# Patient Record
Sex: FEMALE | Race: WHITE | NOT HISPANIC OR LATINO | Employment: OTHER | URBAN - METROPOLITAN AREA
[De-identification: names, ages, dates, MRNs, and addresses within clinical notes are randomized per-mention and may not be internally consistent; named-entity substitution may affect disease eponyms.]

---

## 2017-12-04 ENCOUNTER — OFFICE VISIT (OUTPATIENT)
Dept: NEUROLOGY | Facility: CLINIC | Age: 67
End: 2017-12-04
Payer: MEDICARE

## 2017-12-04 VITALS
HEIGHT: 62 IN | WEIGHT: 153.69 LBS | BODY MASS INDEX: 28.28 KG/M2 | SYSTOLIC BLOOD PRESSURE: 146 MMHG | DIASTOLIC BLOOD PRESSURE: 88 MMHG | HEART RATE: 84 BPM | RESPIRATION RATE: 17 BRPM

## 2017-12-04 DIAGNOSIS — G95.9 MYELOPATHY: ICD-10-CM

## 2017-12-04 DIAGNOSIS — R47.9 SPEECH DISORDER: ICD-10-CM

## 2017-12-04 DIAGNOSIS — R27.0 ATAXIA: Primary | ICD-10-CM

## 2017-12-04 PROCEDURE — 99999 PR PBB SHADOW E&M-NEW PATIENT-LVL III: CPT | Mod: PBBFAC,,, | Performed by: PSYCHIATRY & NEUROLOGY

## 2017-12-04 PROCEDURE — 99203 OFFICE O/P NEW LOW 30 MIN: CPT | Mod: PBBFAC | Performed by: PSYCHIATRY & NEUROLOGY

## 2017-12-04 PROCEDURE — 99204 OFFICE O/P NEW MOD 45 MIN: CPT | Mod: S$PBB | Performed by: PSYCHIATRY & NEUROLOGY

## 2017-12-04 PROCEDURE — 99999 PR STA SHADOW: CPT | Mod: PBBFAC,,, | Performed by: PSYCHIATRY & NEUROLOGY

## 2017-12-04 RX ORDER — EPINEPHRINE 0.22MG
100 AEROSOL WITH ADAPTER (ML) INHALATION DAILY
COMMUNITY

## 2017-12-04 RX ORDER — HYDROCHLOROTHIAZIDE 12.5 MG/1
12.5 CAPSULE ORAL DAILY
COMMUNITY
Start: 2017-11-29 | End: 2022-07-14

## 2017-12-04 RX ORDER — ASPIRIN 81 MG/1
81 TABLET ORAL DAILY
COMMUNITY

## 2017-12-04 RX ORDER — LISINOPRIL 40 MG/1
40 TABLET ORAL DAILY
COMMUNITY
Start: 2017-11-29

## 2017-12-04 RX ORDER — DIAZEPAM 5 MG/1
TABLET ORAL
Qty: 2 TABLET | Refills: 0 | Status: SHIPPED | OUTPATIENT
Start: 2017-12-04 | End: 2017-12-27 | Stop reason: CLARIF

## 2017-12-04 RX ORDER — CYANOCOBALAMIN 1000 UG/ML
1000 INJECTION, SOLUTION INTRAMUSCULAR; SUBCUTANEOUS WEEKLY
COMMUNITY
End: 2017-12-27 | Stop reason: CLARIF

## 2017-12-04 NOTE — PROGRESS NOTES
"Consult from Dr Villalta  HPI: Tari Pablo is a 67 y.o. female with history of abnormal gait which started earlier this year. She is here with her daughter today.  Walking difficulty is described as "being off balance" with initially starting gait. A walker has helped. Daughter has noted difficulty with her speech during this time which is not worse and may be better and seemed worse in the afternoon. Speech was slower to family.   She she has no shaking tremor. No double vision. No faintness. Her last fall was in October. She continues to drive without difficulty.   Patient's parents did not have ambulatory problems. She has a family history of CMT in her cousins  Note she saw another neurologist (Dr Galvez) early this year for slurred speech for several months and had MRI brain, EEG, UE EMG, Carotid US and Lumbar xray as below  No numbness in the feet.   She is   She lives along  Review of Systems   Constitutional: Negative for fever.   HENT: Negative for nosebleeds.    Eyes: Negative for double vision.   Respiratory: Negative for hemoptysis.    Cardiovascular: Negative for leg swelling.   Gastrointestinal: Negative for blood in stool.   Genitourinary: Negative for hematuria.   Musculoskeletal: Positive for falls. Negative for neck pain.   Skin: Negative for rash.   Neurological: Negative for sensory change and seizures.   Psychiatric/Behavioral: Negative for memory loss.         I have reviewed all of this patient's past medical and surgical histories as well as family and social histories and active allergies and medications as documented in the electronic medical record.    Exam:  Gen Appearance, well developed/nourished in no apparent distress  CV: 2+ distal pulses with no edema or swelling  Neuro:  MS: Awake, alert, oriented to place, person, time, situation. Sustains attention. Recent/remote memory intact, Language is full to spontaneous speech/repetition/naming/comprehension. Fund of Knowledge is full " "but patient's speech seems mildly slow, slurred, and scanning- cerebellar speech deficit???? This is mild.   CN: Optic discs are flat with normal vasculature, PERRL, Extraoccular movements and visual fields are full. Normal facial sensation and strength, Hearing symmetric, Tongue and Palate are midline and strong. Shoulder Shrug symmetric and strong.  Motor: Normal bulk, tone is increased to spasticity in the legs more than arms, no abnormal movements. 5/5 strength bilateral upper/lower extremities with 2+ reflexes in the arms and 4+ in the legs with right foot clonus and bilateral plantar response  There is reduced blink and reduced facial expression  Sensory: symmetric to light touch,  temp, and vibration,  Romberg negative  Cerebellar: Finger-nose,Heal-shin, Rapid alternating movements intact  Gait: stance is mildly wide based, there is moderate  Ataxia and shuffling    Imaging: MRI brain 2017 "multiple large areas of increased signal on T2 and FLAIR within in the white matter bilaterally likely representing white matter ischemic change"    Lumbar xray 2017: facet arthropathy and vacuum disc    Carotid US: less than 40% stenosis 2017    EE: normal    EMG 2017: no denervation in the head and neck         Assessment/Plan: Tari Pablo is a 67 y.o. female with several months of speech and gait changes. 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   I recommend:   1. Repeat MRI brain given her large white matter changes prior. Add MRI Cervical spine to rule out process to explain her symptoms  Take one tablet 45 minutes prior to MRI. If needed, take an additional tablet when called for MRI.   2. Labs: B12, HTLV antibodies,CK, RPR, Heavy metal screening, Thiamine, Zinc, Copper, VLFA, JULIO C, TSH, CMP, CBC, Paraneoplastic antibodies and anti-ALEXY antibodies.   3. Obtain Labs and Last " note from Dr Galvez. Note her 2017 MRI brain, EEG, UE EMG, Carotid US and Lumbar xray all largely unremarkable other than white matter changes on MRI brain.   4. If the above is unremarkable, she will need an ataxia gene evaluation as discussed.  NOTE: She has a family history of CMT in her cousins. Continue PT. Fall precautions reviewed.   RTC after the above  CC: Dr Villalta

## 2017-12-04 NOTE — LETTER
December 4, 2017      Isai Villalta MD  [address] [place] [id number]           [facility]  [address] [facility] [address]  Phone: [phone]  Fax: [phone]          Patient: Tari Pablo   MR Number: [id number]   YOB: 1950   Date of Visit: 12/4/2017       Dear Dr. Isai Villalta:    Thank you for referring Tari Pablo to me for evaluation. Attached you will find relevant portions of my assessment and plan of care.    If you have questions, please do not hesitate to call me. I look forward to following Tari Pablo along with you.    Sincerely,    Neil Pop MD    Enclosure  CC:  No Recipients    If you would like to receive this communication electronically, please contact [email or web address] or [phone] to request more information on [employer] Link access.    For providers and/or their staff who would like to refer a patient to [facility], please contact us through our one-stop-shop provider referral line, [facility], at [phone].    If you feel you have received this communication in error or would no longer like to receive these types of communications, please e-mail [email or web address]

## 2017-12-05 ENCOUNTER — LAB VISIT (OUTPATIENT)
Dept: LAB | Facility: HOSPITAL | Age: 67
End: 2017-12-05
Attending: PSYCHIATRY & NEUROLOGY
Payer: MEDICARE

## 2017-12-05 DIAGNOSIS — R27.0 ATAXIA: ICD-10-CM

## 2017-12-05 DIAGNOSIS — G95.9 MYELOPATHY: ICD-10-CM

## 2017-12-05 DIAGNOSIS — R47.9 SPEECH DISORDER: ICD-10-CM

## 2017-12-05 LAB
ALBUMIN SERPL BCP-MCNC: 4 G/DL
ALP SERPL-CCNC: 79 U/L
ALT SERPL W/O P-5'-P-CCNC: 19 U/L
ANION GAP SERPL CALC-SCNC: 10 MMOL/L
AST SERPL-CCNC: 18 U/L
BASOPHILS # BLD AUTO: 0.04 K/UL
BASOPHILS NFR BLD: 0.5 %
BILIRUB SERPL-MCNC: 0.6 MG/DL
BUN SERPL-MCNC: 22 MG/DL
CALCIUM SERPL-MCNC: 10 MG/DL
CHLORIDE SERPL-SCNC: 103 MMOL/L
CK SERPL-CCNC: 71 U/L
CO2 SERPL-SCNC: 29 MMOL/L
CREAT SERPL-MCNC: 0.8 MG/DL
DIFFERENTIAL METHOD: NORMAL
EOSINOPHIL # BLD AUTO: 0.1 K/UL
EOSINOPHIL NFR BLD: 1.8 %
ERYTHROCYTE [DISTWIDTH] IN BLOOD BY AUTOMATED COUNT: 13.5 %
EST. GFR  (AFRICAN AMERICAN): >60 ML/MIN/1.73 M^2
EST. GFR  (NON AFRICAN AMERICAN): >60 ML/MIN/1.73 M^2
GLUCOSE SERPL-MCNC: 108 MG/DL
HCT VFR BLD AUTO: 41.9 %
HGB BLD-MCNC: 13.7 G/DL
LYMPHOCYTES # BLD AUTO: 1.8 K/UL
LYMPHOCYTES NFR BLD: 23.3 %
MCH RBC QN AUTO: 29.7 PG
MCHC RBC AUTO-ENTMCNC: 32.7 G/DL
MCV RBC AUTO: 91 FL
MONOCYTES # BLD AUTO: 0.5 K/UL
MONOCYTES NFR BLD: 5.9 %
NEUTROPHILS # BLD AUTO: 5.2 K/UL
NEUTROPHILS NFR BLD: 68.5 %
PLATELET # BLD AUTO: 285 K/UL
PMV BLD AUTO: 10.2 FL
POTASSIUM SERPL-SCNC: 4 MMOL/L
PROT SERPL-MCNC: 7.6 G/DL
RBC # BLD AUTO: 4.61 M/UL
SODIUM SERPL-SCNC: 142 MMOL/L
TSH SERPL DL<=0.005 MIU/L-ACNC: 1.41 UIU/ML
VIT B12 SERPL-MCNC: 632 PG/ML
WBC # BLD AUTO: 7.65 K/UL

## 2017-12-05 PROCEDURE — 85025 COMPLETE CBC W/AUTO DIFF WBC: CPT

## 2017-12-05 PROCEDURE — 86038 ANTINUCLEAR ANTIBODIES: CPT

## 2017-12-05 PROCEDURE — 82607 VITAMIN B-12: CPT

## 2017-12-05 PROCEDURE — 84443 ASSAY THYROID STIM HORMONE: CPT

## 2017-12-05 PROCEDURE — 84425 ASSAY OF VITAMIN B-1: CPT

## 2017-12-05 PROCEDURE — 86592 SYPHILIS TEST NON-TREP QUAL: CPT

## 2017-12-05 PROCEDURE — 36415 COLL VENOUS BLD VENIPUNCTURE: CPT

## 2017-12-05 PROCEDURE — 84630 ASSAY OF ZINC: CPT

## 2017-12-05 PROCEDURE — 86687 HTLV-I ANTIBODY: CPT

## 2017-12-05 PROCEDURE — 80053 COMPREHEN METABOLIC PANEL: CPT

## 2017-12-05 PROCEDURE — 82550 ASSAY OF CK (CPK): CPT

## 2017-12-05 PROCEDURE — 82525 ASSAY OF COPPER: CPT

## 2017-12-06 LAB
ANA SER QL IF: NORMAL
ARSENIC BLD-MCNC: <1 NG/ML (ref 0–12)
CADMIUM BLD-MCNC: 0.4 NG/ML (ref 0–4.9)
CITY: NORMAL
COUNTY: NORMAL
GUARDIAN FIRST NAME: NORMAL
GUARDIAN LAST NAME: NORMAL
HOME PHONE: NORMAL
HTLV I+II AB SER QL IA: NEGATIVE
LEAD BLD-MCNC: <1 MCG/DL (ref 0–4.9)
MERCURY BLD-MCNC: <1 NG/ML (ref 0–9)
RACE: NORMAL
RPR SER QL: NORMAL
STATE: NORMAL
STREET ADDRESS: NORMAL
VENOUS/CAPILLARY: NORMAL
ZIP: NORMAL

## 2017-12-07 LAB
COPPER SERPL-MCNC: 1444 UG/L (ref 810–1990)
ZINC SERPL-MCNC: 86 UG/DL (ref 60–130)

## 2017-12-08 ENCOUNTER — HOSPITAL ENCOUNTER (OUTPATIENT)
Dept: RADIOLOGY | Facility: HOSPITAL | Age: 67
Discharge: HOME | End: 2017-12-08
Attending: PSYCHIATRY & NEUROLOGY
Payer: MEDICARE

## 2017-12-08 DIAGNOSIS — R47.9 SPEECH DISORDER: ICD-10-CM

## 2017-12-08 DIAGNOSIS — G95.9 MYELOPATHY: ICD-10-CM

## 2017-12-08 DIAGNOSIS — R27.0 ATAXIA: ICD-10-CM

## 2017-12-08 LAB
ANNOTATION COMMENT IMP: NORMAL
GAD65 AB SER-SCNC: 0.01 NMOL/L
PHYTANATE SERPL-SCNC: 1.37 NMOL/ML
PRISTANATE SERPL-SCNC: 0.17 NMOL/ML
PRISTANATE/PHYTANATE SERPL-SRTO: 0.12 RATIO
VIT B1 SERPL-MCNC: 57 UG/L (ref 38–122)
VLCFA C22:0 SERPL-SCNC: 85.8 NMOL/ML
VLCFA C24:0 SERPL-SCNC: 66.3 NMOL/ML
VLCFA C24:0/C22:0 SERPL-SRTO: 0.77 RATIO
VLCFA C26:0 SERPL-SCNC: 0.59 NMOL/ML
VLCFA C26:0/C22:0 SERPL-SRTO: 0.01 RATIO

## 2017-12-08 PROCEDURE — 70553 MRI BRAIN STEM W/O & W/DYE: CPT | Mod: TC

## 2017-12-08 PROCEDURE — 72141 MRI NECK SPINE W/O DYE: CPT | Mod: TC

## 2017-12-08 PROCEDURE — 70553 MRI BRAIN STEM W/O & W/DYE: CPT | Mod: 26,,, | Performed by: RADIOLOGY

## 2017-12-08 PROCEDURE — 25500020 PHARM REV CODE 255: Performed by: PSYCHIATRY & NEUROLOGY

## 2017-12-08 PROCEDURE — A9585 GADOBUTROL INJECTION: HCPCS | Performed by: PSYCHIATRY & NEUROLOGY

## 2017-12-08 PROCEDURE — 72141 MRI NECK SPINE W/O DYE: CPT | Mod: 26,,, | Performed by: RADIOLOGY

## 2017-12-08 RX ORDER — GADOBUTROL 604.72 MG/ML
6 INJECTION INTRAVENOUS
Status: COMPLETED | OUTPATIENT
Start: 2017-12-08 | End: 2017-12-08

## 2017-12-08 RX ADMIN — GADOBUTROL 6 ML: 604.72 INJECTION INTRAVENOUS at 11:12

## 2017-12-12 LAB
ACHR BIND AB SER-SCNC: 0.01 NMOL/L
AMPHIPHYSIN AB TITR SER: NEGATIVE TITER
CV2 IGG TITR SER: NEGATIVE TITER
GLIAL NUC TYPE 1 AB TITR SER: NEGATIVE TITER
HU1 AB TITR SER: NEGATIVE TITER
HU2 AB TITR SER IF: NEGATIVE TITER
HU3 AB TITR SER: NEGATIVE TITER
IMMUNOLOGIST REVIEW: NORMAL
NACHR AB SER-SCNC: 0 NMOL/L
PAVAL REFLEX TEST ADDED: NORMAL
PCA-1 AB TITR SER: NEGATIVE TITER
PCA-2 AB TITR SER: NEGATIVE TITER
PCA-TR AB TITR SER: NEGATIVE TITER
STRIA MUS AB TITR SER: NEGATIVE TITER
VGCC-N BIND AB SER-SCNC: 0 NMOL/L
VGCC-P/Q BIND AB SER-SCNC: 0 NMOL/L
VGKC AB SER-SCNC: 0 NMOL/L

## 2017-12-15 DIAGNOSIS — R27.0 ATAXIA: Primary | ICD-10-CM

## 2017-12-21 ENCOUNTER — TELEPHONE (OUTPATIENT)
Dept: NEUROLOGY | Facility: CLINIC | Age: 67
End: 2017-12-21

## 2017-12-21 NOTE — TELEPHONE ENCOUNTER
Notify patient: The lab draw for genetic testing was denied. There is concern her insurance company will not pay for the testing.  I would like to speak to her more in person about getting another opinion from a genetics or neuromuscular doc about this at he follow up visit scheduled. We will discuss options  Please keep that appointment. Thanks.

## 2017-12-27 PROBLEM — S52.572A: Status: ACTIVE | Noted: 2017-12-27

## 2017-12-28 PROBLEM — S52.572A CLOSED DIE PUNCH FRACTURE OF DISTAL RADIUS, LEFT, INITIAL ENCOUNTER: Status: ACTIVE | Noted: 2017-12-28

## 2018-01-05 ENCOUNTER — HOSPITAL ENCOUNTER (OUTPATIENT)
Dept: RADIOLOGY | Facility: HOSPITAL | Age: 68
Discharge: HOME | End: 2018-01-05
Attending: PSYCHIATRY & NEUROLOGY
Payer: MEDICARE

## 2018-01-05 ENCOUNTER — OFFICE VISIT (OUTPATIENT)
Dept: NEUROLOGY | Facility: CLINIC | Age: 68
End: 2018-01-05
Payer: MEDICARE

## 2018-01-05 VITALS
RESPIRATION RATE: 16 BRPM | HEIGHT: 62 IN | BODY MASS INDEX: 27.79 KG/M2 | DIASTOLIC BLOOD PRESSURE: 80 MMHG | SYSTOLIC BLOOD PRESSURE: 128 MMHG | HEART RATE: 89 BPM | WEIGHT: 151 LBS

## 2018-01-05 DIAGNOSIS — R31.21 ASYMPTOMATIC MICROSCOPIC HEMATURIA: ICD-10-CM

## 2018-01-05 DIAGNOSIS — E04.1 THYROID NODULE: ICD-10-CM

## 2018-01-05 DIAGNOSIS — R27.0 ATAXIA: Primary | ICD-10-CM

## 2018-01-05 DIAGNOSIS — R47.9 SPEECH DISORDER: ICD-10-CM

## 2018-01-05 DIAGNOSIS — R25.1 TREMOR: ICD-10-CM

## 2018-01-05 PROCEDURE — 76536 US EXAM OF HEAD AND NECK: CPT | Mod: 26,,, | Performed by: RADIOLOGY

## 2018-01-05 PROCEDURE — 99999 PR STA SHADOW: CPT | Mod: PBBFAC,,, | Performed by: PSYCHIATRY & NEUROLOGY

## 2018-01-05 PROCEDURE — 99214 OFFICE O/P EST MOD 30 MIN: CPT | Mod: S$PBB | Performed by: PSYCHIATRY & NEUROLOGY

## 2018-01-05 PROCEDURE — 76536 US EXAM OF HEAD AND NECK: CPT | Mod: TC

## 2018-01-05 PROCEDURE — 99213 OFFICE O/P EST LOW 20 MIN: CPT | Mod: PBBFAC | Performed by: PSYCHIATRY & NEUROLOGY

## 2018-01-05 PROCEDURE — 99999 PR PBB SHADOW E&M-EST. PATIENT-LVL III: CPT | Mod: PBBFAC,,, | Performed by: PSYCHIATRY & NEUROLOGY

## 2018-01-05 NOTE — PROGRESS NOTES
HPI: hi Pablo is a 67 y.o. female with several months of speech and gait changes. 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   Since the last visit, the patient fell and broke her left wrist. She fell back while attempting to move a fridge door.  She is s/p surgery for this. She completed PT at this time- may have more later per ortho.    Recently she is more annoyed by not having a diagnosis. She is not depressed. Family states she has always cried easily over many years.     Review of Systems   Constitutional: Negative for fever.   HENT: Negative for nosebleeds.    Eyes: Negative for double vision.   Respiratory: Negative for hemoptysis.    Cardiovascular: Negative for leg swelling.   Gastrointestinal: Negative for blood in stool.   Genitourinary: Negative for hematuria.   Musculoskeletal: Positive for falls. Negative for neck pain.   Skin: Negative for rash.   Neurological: Positive for tremors.        She states she notes a mild and tiny episodic tremor bilateral since breaking wrist.    Psychiatric/Behavioral: Negative for depression and memory loss.         I have reviewed all of this patient's past medical and surgical histories as well as family and social histories and active allergies and medications as documented in the electronic medical record.    Exam:  Gen Appearance, well developed/nourished in no apparent distress  CV: 2+ distal pulses with no edema or swelling  Neuro:  MS: Awake, alert,Sustains attention. Recent/remote memory intact, Language is full to spontaneous speech/comprehension. Fund of Knowledge is full but patient's speech seems mildly slow, slurred, and scanning- cerebellar speech deficit which is subtle.   She has mild emotional incontinence possible  CN: Optic discs are flat with normal vasculature, PERRL, Extraoccular movements and visual fields are  full. Normal facial sensation and strength, Hearing symmetric, Tongue and Palate are midline and strong. Shoulder Shrug symmetric and strong.  Motor: Normal bulk, tone is increased to spasticity in the legs more than arms and more spasticity on the right today, no abnormal movement at rest but tremor of outstretched.  5/5 strength bilateral upper/lower extremities with 2+ reflexes in the arms and 4+ in the legs with right foot clonus    There is reduced blink and reduced facial expression  Sensory: Romberg negative  Cerebellar: Finger-nose,Heal-shin, Rapid alternating movements intact  Gait: stance is mildly wide based, there is moderate  Ataxia and shuffling which is worse today. There is mild impairment of postural reflexes.     Imaging: MRI brain repeat 2017 (repeated after 2017 exam: Age-appropriate generalized volume loss with scattered T2/FLAIR signal abnormality within the supratentorial white matter  while nonspecific suggestive for moderate degree of  chronic microvascular ischemic change.    No evidence for acute infarction or enhancing lesion.    MRI C spine: Multilevel degenerative changes of the cervical spine contributing to central canal stenosis or neural foraminal narrowing as detailed in the above report.    Atrophy of the thyroid gland with bilateral thyroid nodules.  Consider further evaluation with thyroid ultrasound as clinically warranted      Carotid US: less than 40% stenosis 2017    EE: normal    EMG 2017: no denervation in the head and neck     Labs: 2017: B12, HTLV antibodies,CK, RPR, Heavy metal screening, Thiamine, Zinc, Copper, VLFA, JULIO C, TSH, CMP, CBC, Paraneoplastic antibodies and anti-ALEXY antibodies    2017 UA done pre-op for wrist surgery: Trace of blood (non-cath specimen)    Assessment/Plan: Tari Pablo is a 67 y.o. female with several months of speech and gait changes. Exam shows scanning cerebellar speech and myelopathic/ataxic signs as well right sided UMN signs  with increased spasticity. Questioning  Neurodegenerative process like PD plus syndrome (CBGD, OPCD) vs spinal cerebellar ataxia. MRI brain and C spine unremarkable. Note development of tremor more recently.   I recommend:   1. Order thyroid US for further routine evaluation of thyroid nodules.   2. Repeat UA given trace blood found after fall. Consider urology referral if any hematuria on labs or noted by patient.   3. Refer to movement disorders specialist for another opinion about  her symptoms which have been of subacute onset.  4. Genetic testing to rule out SCA was denied. But consider referral to genetics specialist if no other cause found/treated.NOTE: She has a family history of CMT in her cousins.   5. Fall precautions reviewed. She is pending more PT with orthopedist.   6. Watch for pseudobulbar affect over time.     RTC after the above.

## 2018-01-10 ENCOUNTER — TELEPHONE (OUTPATIENT)
Dept: NEUROLOGY | Facility: CLINIC | Age: 68
End: 2018-01-10

## 2018-01-10 DIAGNOSIS — R26.9 ABNORMAL GAIT: Primary | ICD-10-CM

## 2018-01-10 NOTE — TELEPHONE ENCOUNTER
Patient calling that she understood that you want her to start PT for her back and legs. She is doing PT right now for her wrist, ordered by her orthopedist. If you would want her to start PT for her back and legs, we will need new orders sent to [facility]. Please advise.

## 2018-02-28 ENCOUNTER — OFFICE VISIT (OUTPATIENT)
Dept: NEUROLOGY | Facility: CLINIC | Age: 68
End: 2018-02-28
Payer: MEDICARE

## 2018-02-28 VITALS
SYSTOLIC BLOOD PRESSURE: 145 MMHG | HEIGHT: 62 IN | WEIGHT: 151.25 LBS | BODY MASS INDEX: 27.83 KG/M2 | DIASTOLIC BLOOD PRESSURE: 84 MMHG | HEART RATE: 84 BPM

## 2018-02-28 DIAGNOSIS — G23.1 PROGRESSIVE SUPRANUCLEAR PALSY: Primary | ICD-10-CM

## 2018-02-28 DIAGNOSIS — R47.9 SPEECH DISORDER: ICD-10-CM

## 2018-02-28 DIAGNOSIS — R26.9 GAIT DISTURBANCE: ICD-10-CM

## 2018-02-28 DIAGNOSIS — R29.6 MULTIPLE FALLS: ICD-10-CM

## 2018-02-28 PROCEDURE — 99214 OFFICE O/P EST MOD 30 MIN: CPT | Mod: PBBFAC | Performed by: STUDENT IN AN ORGANIZED HEALTH CARE EDUCATION/TRAINING PROGRAM

## 2018-02-28 PROCEDURE — 99999 PR PBB SHADOW E&M-EST. PATIENT-LVL IV: CPT | Mod: PBBFAC,GC,, | Performed by: STUDENT IN AN ORGANIZED HEALTH CARE EDUCATION/TRAINING PROGRAM

## 2018-02-28 PROCEDURE — 99204 OFFICE O/P NEW MOD 45 MIN: CPT | Mod: S$PBB,GC,, | Performed by: PSYCHIATRY & NEUROLOGY

## 2018-02-28 RX ORDER — MULTIVITAMIN
1 TABLET ORAL DAILY
COMMUNITY

## 2018-02-28 RX ORDER — CARBIDOPA AND LEVODOPA 25; 100 MG/1; MG/1
1 TABLET ORAL 3 TIMES DAILY
Qty: 90 TABLET | Refills: 11 | Status: SHIPPED | OUTPATIENT
Start: 2018-02-28 | End: 2018-05-21 | Stop reason: SDUPTHER

## 2018-02-28 NOTE — PROGRESS NOTES
Patient Name: Tari Pablo  MRN: [id number]    CC: Gait difficulty    HPI: Tari Pablo is a 67 y.o. female who presents for evaluation of difficulty with speech and walking. She is accompanied by her brother and sister who assist with history. Her symptoms were first noticed around August 2016, when her sister's friend who is a speech pathologist noticed that her speech was slow. Since then she has had progressive difficulty with walking, and now requires a walker for support. She describes it mostly as difficulty getting started and stopping. She has had multiple falls related to tripping, or when she is caught off guard and loses balance (eg. One morning she went out to get the mail and a wasp flew out which startled her and she fell). This has resulted in bilateral wrist fractures at separate times, both requiring surgery. She also describes feeling frozen in space at times. She denies any associated dizziness or lightheadedness. Her speech has also progressively worsened, and she finds it difficult to get words out at times. She has no difficulty with language or understanding others. She does describe coughing with eating a few times, but is not concerned with this.     She denies any focal weakness, numbness or tremors. She has had no vision changes, urinary or bowel issues. She has always had difficulty staying asleep which has remained unchanged, and denies being told that she acts out dreams. She denies any visual or auditory hallucinations, memory or other cognitive issues or changes in smell. She lives alone and is able to perform ADLs independently, but did get some equipment for her bathroom to help. She denies any recent travel - the last time she went out of state was May 2016. She reports a strong family history of CMT in her maternal second cousins. She denies a history of clumsiness and says she was able to keep up with her peers with running and play.    She has seen Dr. Galvez and Dr. Pop  with extensive lab workup, MRI brain and cervical spine, EEG and EMG. All of this has been unremarkable. She has been to physical therapy since June 2017 which she feels has had a mild benefit. She has tried no other therapies.      ROS:   Review of Systems   Constitutional: Negative for malaise/fatigue. Negative for weight loss.   HENT: Negative for hearing loss.   Eyes: Negative for blurred vision and double vision.   Respiratory: Negative for shortness of breath and stridor.   Cardiovascular: Negative for chest pain and palpitations.   Gastrointestinal: Negative for nausea, vomiting and constipation.   Genitourinary: Negative for frequency. Negative for urgency.   Musculoskeletal: Negative for joint pain. Negative for myalgias and falls.   Skin: Negative for rash.   Neurological: Negative for dizziness and tremors. Negative for focal weakness and seizures.   Endo/Heme/Allergies: Does not bruise/bleed easily.   Psychiatric/Behavioral: Negative for memory loss. Negative for depression and hallucinations. The patient is not nervous/anxious.      Past Medical History  Past Medical History:   Diagnosis Date    Arthritis     Balance problem     Fall at home     High cholesterol     Hypertension     Lumbar herniated disc        Medications    Current Outpatient Prescriptions:     aspirin (ECOTRIN) 81 MG EC tablet, Take 81 mg by mouth once daily., Disp: , Rfl:     coenzyme Q10 (CO Q-10) 100 mg capsule, Take 100 mg by mouth once daily., Disp: , Rfl:     hydroCHLOROthiazide (MICROZIDE) 12.5 mg capsule, Take 12.5 mg by mouth once daily. , Disp: , Rfl:     lisinopril (PRINIVIL,ZESTRIL) 40 MG tablet, Take 40 mg by mouth. , Disp: , Rfl:     multivitamin (ONE DAILY MULTIVITAMIN) per tablet, Take 1 tablet by mouth once daily., Disp: , Rfl:     Allergies  Review of patient's allergies indicates:  No Known Allergies    Social History  Social History     Social History    Marital status:      Spouse name: N/A     "Number of children: N/A    Years of education: N/A     Occupational History    Not on file.     Social History Main Topics    Smoking status: Never Smoker    Smokeless tobacco: Never Used    Alcohol use No    Drug use: No    Sexual activity: Not on file     Other Topics Concern    Not on file     Social History Narrative    No narrative on file       Family History  Family History   Problem Relation Age of Onset    Diabetes Mother     Hypertension Mother     Cancer Mother     Stroke Father     Charcot-Emma-Tooth disease Cousin      Multiple maternal cousins       Physical Exam  BP (!) 145/84   Pulse 84   Ht 5' 2" (1.575 m)   Wt 68.6 kg (151 lb 3.8 oz)   BMI 27.66 kg/m²        Constitutional  Well-developed, well-nourished, appears stated age   Neurological    * Mental status      - Orientation  Oriented to person, place, time, and situation     - Memory   Intact recent and remote. 3/3 recall at 5 min     - Attention/concentration  Attentive, vigilant during exam     - Language  Naming & repetition intact, +2-step commands     - Fund of knowledge  Aware of current events     - Executive  Well-organized thoughts     - Other     * Cranial nerves       - CN II  PERRL, visual fields full to confrontation     - CN III, IV, VI  Extraocular movements full. Saccadic intrusions with rightward gaze. Upon down gaze, eyes made small adjustment with brief left deviation and subsequent correction to midline.     - CN V  Sensation V1 - V3 intact     - CN VII  Face strong and symmetric bilaterally     - CN VIII  Hearing reduced on the left     - CN IX, X  Palate raises midline and symmetric. No palatal tremor     - CN XI  SCM and trapezius 5/5 bilaterally     - CN XII  Tongue midline   * Speech  Scanning, stacatto speech. No dysarthria   * Motor  Tone increased throughout, somewhat clasp knife though limited by patient compliance. Muscle bulk normal, strength 4+/5 throughout BUE. Strength 4+/5 in bilateral hip " flexors, otherwise 5/5 in BLE   * Sensory   Intact to temperature and light touch throughout. Vibration mildly reduced in bilateral lower extremities at toes.   * Coordination  No dysmetria with finger-to-nose. Bradykinetic with rapid movements, equal bilaterally.   * Gait  See below.   * Deep tendon reflexes  3+ and symmetric throughout. +Be's bilaterally, upgoing babinski on the right, downgoing on left       Lab and Test Results    WBC   Date Value Ref Range Status   12/27/2017 8.50 3.90 - 12.70 K/uL Final   12/05/2017 7.65 3.90 - 12.70 K/uL Final     Hemoglobin   Date Value Ref Range Status   12/27/2017 14.2 12.0 - 16.0 g/dL Final   12/05/2017 13.7 12.0 - 16.0 g/dL Final     Hematocrit   Date Value Ref Range Status   12/27/2017 41.9 37.0 - 48.5 % Final   12/05/2017 41.9 37.0 - 48.5 % Final     Platelets   Date Value Ref Range Status   12/27/2017 284 150 - 350 K/uL Final   12/05/2017 285 150 - 350 K/uL Final     Glucose   Date Value Ref Range Status   12/27/2017 98 74 - 106 mg/dL Final   12/05/2017 108 70 - 110 mg/dL Final     Sodium   Date Value Ref Range Status   12/27/2017 144 136 - 145 mmol/L Final   12/05/2017 142 136 - 145 mmol/L Final     Potassium   Date Value Ref Range Status   12/27/2017 4.0 3.5 - 5.1 mmol/L Final   12/05/2017 4.0 3.5 - 5.1 mmol/L Final     Chloride   Date Value Ref Range Status   12/27/2017 101 95 - 110 mmol/L Final   12/05/2017 103 95 - 110 mmol/L Final     CO2   Date Value Ref Range Status   12/27/2017 30 (H) 23 - 29 mmol/L Final   12/05/2017 29 23 - 29 mmol/L Final     BUN, Bld   Date Value Ref Range Status   12/27/2017 16 7 - 17 mg/dL Final   12/05/2017 22 8 - 23 mg/dL Final     Creatinine   Date Value Ref Range Status   12/27/2017 0.80 0.70 - 1.20 mg/dL Final   12/05/2017 0.8 0.5 - 1.4 mg/dL Final     Calcium   Date Value Ref Range Status   12/27/2017 10.3 (H) 8.4 - 10.2 mg/dL Final   12/05/2017 10.0 8.7 - 10.5 mg/dL Final     Alkaline Phosphatase   Date Value Ref Range  Status   12/27/2017 96 38 - 145 U/L Final   12/05/2017 79 55 - 135 U/L Final     ALT   Date Value Ref Range Status   12/27/2017 35 10 - 44 U/L Final   12/05/2017 19 10 - 44 U/L Final     AST   Date Value Ref Range Status   12/27/2017 36 14 - 36 U/L Final   12/05/2017 18 10 - 40 U/L Final       Results for EVI EM (MRN [id number]) as of 2/28/2018 08:03   Ref. Range 12/5/2017 08:40 12/27/2017 11:46   Arsenic Latest Ref Range: 0 - 12 ng/mL <1    Thiamine Latest Ref Range: 38 - 122 ug/L 57    Vitamin B-12 Latest Ref Range: 210 - 950 pg/mL 632    Glutamic Acid Decarb Ab Latest Ref Range: <=0.02 nmol/L 0.01    TSH Latest Ref Range: 0.400 - 4.000 uIU/mL 1.411        Results for EVI EM (MRN [id number]) as of 2/28/2018 08:03   Ref. Range 12/5/2017 08:40   AChR Binding Ab, Serum Latest Ref Range: <=0.02 nmol/L 0.01   AChR Ganglionic Neuronal Ab Latest Ref Range: <=0.02 nmol/L 0.00   NMO Interpretive Comments Unknown SEE BELOW   PAVAL reflex test added Unknown None.   Striated Muscle Ab Latest Ref Range: <1:120 titer Negative     Results for [facility]I EM (MRN [id number]) as of 2/28/2018 08:03   Ref. Range 12/5/2017 08:40   HTLV I/II Ab Unknown Negative   RPR Latest Ref Range: Non-reactive  Non-reactive     Results for EVI EM (MRN [id number]) as of 2/28/2018 08:03   Ref. Range 12/5/2017 08:40   C22:0 Latest Ref Range: <=96.3 nmol/mL 85.8   C24:0 Latest Ref Range: <=91.4 nmol/mL 66.3   C24:0/C22:0 Latest Ref Range: <=1.39 ratio 0.77   C26:0 Latest Ref Range: <=1.30 nmol/mL 0.59   C26:0/C22:0 Latest Ref Range: <=0.023 ratio 0.007   Long Chain Fatty Acids Unknown SEE BELOW   Phytanic Acid Latest Ref Range: <=9.88 nmol/mL 1.37   Pristanic Acid Latest Ref Range: <=2.98 nmol/mL 0.17   Pristanic/Phytanic Latest Ref Range: <=0.39 ratio 0.12     Results for EVI EM (MRN [id number]) as of 2/28/2018 08:03   Ref. Range 12/5/2017 08:40   Lead Latest Ref Range: 0.0 - 4.9 mcg/dL <1.0   JULIO C Screen Latest Ref Range: Negative <1:160   "Negative <1:160   Cadmium Latest Ref Range: 0.0 - 4.9 ng/mL 0.4   Copper Latest Ref Range: 810 - 1990 ug/L 1444   CRMP-5 IgG Latest Ref Range: <1:240 titer Negative   Mercury Latest Ref Range: 0 - 9 ng/mL <1   N-Type Calcium Channel Ab Latest Ref Range: <=0.03 nmol/L 0.00   P/Q Type Calcium Channel Ab Latest Ref Range: <=0.02 nmol/L 0.00   PAVAL BRINDA-1, Serum Latest Ref Range: <1:240 titer Negative   PAVAL BRINDA-2, Serum Latest Ref Range: <1:240 titer Negative   PAVAL BRINDA-3, Serum Latest Ref Range: <1:240 titer Negative   PAVAL AGNA-1, Serum Latest Ref Range: <1:240 titer Negative   PAVAL,  Amphiphysin Ab, Serum Latest Ref Range: <1:240 titer Negative   Neuronal (V-G) K+ Channel Ab, Serum Latest Ref Range: <=0.02 nmol/L 0.00   PAVAL, PCA-1, Serum Latest Ref Range: <1:240 titer Negative   PAVAL, PCA-2, Serum Latest Ref Range: <1:240 titer Negative   PAVAL, PCA-Tr, Serum Latest Ref Range: <1:240 titer Negative   Zinc, Serum-ALT Latest Ref Range: 60 - 130 ug/dL 86       Images: MRI Brain 12/2017  Midbrain atrophy. No acute or chronic infarcts.    Independently reviewed YES    Other Tests    EMG 2017  Reportedly normal - "no denervation in the head and neck"    Assessment and Plan    Tari Pablo is a 67 y.o. female who presents with progressive gait and speech abnormalities. On exam she is noted to have findings suggestive of Parkinsonism, including rigidity, bradykinesia and postural instability. Her gait was severely impaired, mostly with initiation and stopping. Though subtle, she also had some EOM findings with saccadic intrusions and compensatory movements for mildly weakened downgaze. Putting this together, she likely has Progressive Supranuclear Palsy which is supported by the mild midbrain atrophy seen on MRI as above. After discussing the options with the patient, we decided to proceed with a levodopa trial with gradual titration over 4 weeks to reach 25-100mg TID. She was educated on the side effects, and will " call/message with any issues. I have also ordered speech therapy in addition to her physical therapy she is already receiving.     I will follow up with her in about 6 weeks to see if she is having any benefit from the levodopa. All questions and concerns were addressed.      Problem List Items Addressed This Visit        Neuro    Progressive supranuclear palsy - Primary    Overview     August 2016 - speech slowing  Progressive gait disturbance         Current Assessment & Plan     -- Start sinemet trial - titrate up over 4 weeks to  TID  -- Patient educated on side effects  -- Continue PT  -- Referral to SLP  -- Follow up 6 weeks         Relevant Orders    Ambulatory Referral to Speech Therapy    Speech disorder    Current Assessment & Plan     -- due to PSP  -- Speech therapy referral            Other    Gait disturbance    Overview     Progressive worsening since end of 2016         Current Assessment & Plan     -- see PSP         Multiple falls    Overview     Bilateral wrist fractures (different times) requiring surgery.  Uses walker         Current Assessment & Plan     -- see PSP                     Vianey Guan MD  Neurology Resident   [facility]  [address] [place] [address]  Pager: [phone]

## 2018-02-28 NOTE — PATIENT INSTRUCTIONS
Carbidopa/levodopa 25/100 titration    Week 1: Take 1/2 tab in AM and 1/2 tab at noon and 1/2 tab in PM  Week 2: Take 1 tab in AM and 1/2 tab at noon and 1/2 tab in PM  Week 3: Take 1 tab in AM and 1 tab at noon and 1/2 tab in PM  Week 4: Take 1 tab in AM and 1 tab at noon and 1 tab in PM    If adequately improved, can stop and maintain at any level of the titration.  Call me with an update anytime.          Carbidopa; Levodopa tablets  What is this medicine?  CARBIDOPA;LEVODOPA (dontae morales; rivka morales) is used to treat the symptoms of Parkinson's disease.  How should I use this medicine?  Take this medicine by mouth with a glass of water. Follow the directions on the prescription label. Take your doses at regular intervals. Do not take your medicine more often than directed. Do not stop taking except on the advice of your doctor or health care professional.  Talk to your pediatrician regarding the use of this medicine in children. Special care may be needed.  What side effects may I notice from receiving this medicine?  Side effects that you should report to your doctor or health care professional as soon as possible:  · allergic reactions like skin rash, itching or hives, swelling of the face, lips, or tongue  · anxiety, confusion, or nervousness  · falling asleep during normal activities like driving  · fast, irregular heartbeat  · hallucination, loss of contact with reality  · mood changes like aggressive behavior, depression  · stomach pain  · trouble passing urine  · uncontrolled movements of the mouth, head, hands, feet, shoulders, eyelids or other unusual muscle movements  Side effects that usually do not require medical attention (report to your doctor or health care professional if they continue or are bothersome):  · headache  · loss of appetite  · muscle twitches  · nausea, vomiting  · nightmares, trouble sleeping  · unusually weak or tired  What may interact with this medicine?  Do not take this  medicine with any of the following medications:  · MAOIs like Marplan, Nardil, and Parnate  · reserpine  · tetrabenazine  This medicine may also interact with the following medications:  · alcohol  · droperidol  · entacapone  · iron supplements or multivitamins with iron  · isoniazid, INH  · linezolid  · medicines for depression, anxiety, or psychotic disturbances  · medicines for high blood pressure  · medicines for sleep  · metoclopramide  · papaverine  · procarbazine  · tedizolid  · rasagiline  · selegiline  · tolcapone  What if I miss a dose?  If you miss a dose, take it as soon as you can. If it is almost time for your next dose, take only that dose. Do not take double or extra doses.  Where should I keep my medicine?  Keep out of the reach of children.  Store at room temperature between 15 and 30 degrees C (59 and 86 degrees F). Protect from light. Throw away any unused medicine after the expiration date.  What should I tell my health care provider before I take this medicine?  They need to know if you have any of these conditions:  · asthma or lung disease  · depression or other mental illness  · diabetes  · glaucoma  · heart disease, including history of a heart attack  · irregular heart beat  · kidney or liver disease  · melanoma or suspicious skin lesions  · stomach or intestine ulcers  · an unusual or allergic reaction to levodopa, carbidopa, other medicines, foods, dyes, or preservatives  · pregnant or trying to get pregnant  · breast-feeding  What should I watch for while using this medicine?  Visit your doctor or health care professional for regular checks on your progress. It may be several weeks or months before you feel the full benefits of this medicine. Continue to take your medicine on a regular schedule. Do not take any additional medicines for Parkinson's disease without first consulting with your health care provider.  You may experience a wearing of effect prior to the time for your next dose  of this medicine. You may also experience an on-off effect where the medicine apparently stops working for anything from a minute to several hours, then suddenly starts working again. Tell your doctor or health care professional if any of these symptoms happen to you. Your dose may need to be changed.  A high protein diet can slow or prevent absorption of this medicine. Avoid high protein foods near the time of taking this medicine to help to prevent these problems. Take this medicine at least 30 minutes before eating or one hour after meals. You may want to eat higher protein foods later in the day or in small amounts. Discuss your diet with your doctor or health care professional or nutritionist.  You may get drowsy or dizzy. Do not drive, use machinery, or do anything that needs mental alertness until you know how this drug affects you. Do not stand or sit up quickly, especially if you are an older patient. This reduces the risk of dizzy or fainting spells. Alcohol can make you more drowsy and dizzy. Avoid alcoholic drinks.  If you find that you have sudden feelings of wanting to sleep during normal activities, like cooking, watching television, or while driving or riding in a car, you should contact your health care professional.  If you are diabetic, this medicine may interfere with the accuracy of some tests for sugar or ketones in the urine (does not interfere with blood tests). Check with your doctor or health care professional before changing the dose of your diabetic medicine.  This medicine may discolor the urine or sweat, making it look darker or red in color. This is of no cause for concern. However, this may stain clothing or fabrics.  There have been reports of increased sexual urges or other strong urges such as gambling while taking some medicines for Parkinson's disease. If you experience any of these urges while taking this medicine, you should report it to your health care provider as soon as  possible.  You should check your skin often for changes to moles and new growths while taking this medicine. Call your doctor if you notice any of these changes.  NOTE:This sheet is a summary. It may not cover all possible information. If you have questions about this medicine, talk to your doctor, pharmacist, or health care provider. Copyright© 2017 [employer]

## 2018-03-04 PROBLEM — R29.6 MULTIPLE FALLS: Status: ACTIVE | Noted: 2018-03-04

## 2018-03-04 PROBLEM — R26.9 GAIT DISTURBANCE: Status: ACTIVE | Noted: 2018-03-04

## 2018-03-04 PROBLEM — G23.1 PROGRESSIVE SUPRANUCLEAR PALSY: Status: ACTIVE | Noted: 2018-03-04

## 2018-03-04 PROBLEM — R47.9 SPEECH DISORDER: Status: ACTIVE | Noted: 2018-03-04

## 2018-03-04 NOTE — ASSESSMENT & PLAN NOTE
-- Start sinemet trial - titrate up over 4 weeks to  TID  -- Patient educated on side effects  -- Continue PT  -- Referral to SLP  -- Follow up 6 weeks

## 2018-03-07 NOTE — PROGRESS NOTES
Patient seen and examined.  I agree with the history, exam, assessment and plan within the resident's note.          Smooth Newsome MD, MPH  Division of Movement and Memory Disorders  [facility]

## 2018-04-02 ENCOUNTER — TELEPHONE (OUTPATIENT)
Dept: NEUROLOGY | Facility: CLINIC | Age: 68
End: 2018-04-02

## 2018-04-02 NOTE — TELEPHONE ENCOUNTER
----- Message from Rachel Jc sent at 2018  9:59 AM CDT -----  Contact: PATIENT  Tari Pablo  MRN: [id number]  : 1950  PCP: Isai Villalta  Home Phone      [phone]  Work Phone      Not on file.  Mobile          [phone]      MESSAGE: Patient states that she was referred to a motion specialist at [facility], she has seen her once & has a follow up scheduled for next week.  She states that the person she saw was not a physician but a resident.  What she is wanting to know is if after that appointment next can she come back to see Dr. Pop for all of her care because it would be much easier for her to get transportation since she no longer drives.        Phone: [phone]

## 2018-04-02 NOTE — TELEPHONE ENCOUNTER
Patient would like to know if she can continue all of her care here instead of traveling [place] and [place] [facility] no longer drives but she will continue going if you thinks she should. She mentions that the provider she saw was not a movement disorders specialist but she did start her on Sinemet and she is to follow up next week. She would like to just come here after the visit next week for all of her care if you are comfortable with that. Please advise.

## 2018-04-02 NOTE — TELEPHONE ENCOUNTER
She should keep appt at [facility] as scheduled this month- she is actually under the care of Dr Newsome, movement disorders specialist.

## 2018-04-10 ENCOUNTER — OFFICE VISIT (OUTPATIENT)
Dept: NEUROLOGY | Facility: CLINIC | Age: 68
End: 2018-04-10
Payer: MEDICARE

## 2018-04-10 VITALS
HEART RATE: 82 BPM | HEIGHT: 62 IN | DIASTOLIC BLOOD PRESSURE: 90 MMHG | WEIGHT: 153 LBS | SYSTOLIC BLOOD PRESSURE: 132 MMHG | BODY MASS INDEX: 28.16 KG/M2

## 2018-04-10 DIAGNOSIS — G23.1 PROGRESSIVE SUPRANUCLEAR PALSY: Primary | ICD-10-CM

## 2018-04-10 DIAGNOSIS — R47.9 SPEECH DISORDER: ICD-10-CM

## 2018-04-10 DIAGNOSIS — R26.9 GAIT DISTURBANCE: ICD-10-CM

## 2018-04-10 DIAGNOSIS — R25.8 BRADYKINESIA: ICD-10-CM

## 2018-04-10 DIAGNOSIS — R29.6 MULTIPLE FALLS: ICD-10-CM

## 2018-04-10 PROCEDURE — 99213 OFFICE O/P EST LOW 20 MIN: CPT | Mod: PBBFAC | Performed by: STUDENT IN AN ORGANIZED HEALTH CARE EDUCATION/TRAINING PROGRAM

## 2018-04-10 PROCEDURE — 99999 PR PBB SHADOW E&M-EST. PATIENT-LVL III: CPT | Mod: PBBFAC,GC,, | Performed by: STUDENT IN AN ORGANIZED HEALTH CARE EDUCATION/TRAINING PROGRAM

## 2018-04-10 PROCEDURE — 99213 OFFICE O/P EST LOW 20 MIN: CPT | Mod: S$PBB,GC,, | Performed by: STUDENT IN AN ORGANIZED HEALTH CARE EDUCATION/TRAINING PROGRAM

## 2018-04-10 NOTE — PATIENT INSTRUCTIONS
THIS IS A TEST  -- Increase sinemet (carbidopa-levodopa) to 1.5 tablets three times per day  -- Email me through [employer] after 4 days (on Saturday) to tell me if it's working  -- If you develop any side effects (nausea, vomiting, abdominal pain, dizziness) let me know immediately

## 2018-04-10 NOTE — PROGRESS NOTES
Patient Name: Tari Pablo  MRN: [id number]    CC: Gait difficulty    Interval history: Tari Pablo is a 67 y.o. female who returns for follow up. Since our last visit she has started sinemet 25-100mg and is up to three times per day (8am, 2pm, 8pm). She says that she definitely has improved, but feels like she still has room to improve. During the first week she felt nauseous with the morning dose, but this has resolved and she is tolerating it well. She hasn't noticed any on/off phenomenon, but says she's the most fatigued around lunch time. She's unsure if her walking is any worse at that time. She denies any new symptoms. She continues to do physical therapy and is hopeful that her walking will continue to improve. She saw speech therapy and was told she didn't require regular follow up with them. She says she no longer gets her mail because her garage and drive way have some uneven ground which makes her feel unsteady.       HPI: Tari Pablo is a 67 y.o. female who presents for evaluation of difficulty with speech and walking. She is accompanied by her brother and sister who assist with history. Her symptoms were first noticed around August 2016, when her sister's friend who is a speech pathologist noticed that her speech was slow. Since then she has had progressive difficulty with walking, and now requires a walker for support. She describes it mostly as difficulty getting started and stopping. She has had multiple falls related to tripping, or when she is caught off guard and loses balance (eg. One morning she went out to get the mail and a wasp flew out which startled her and she fell). This has resulted in bilateral wrist fractures at separate times, both requiring surgery. She also describes feeling frozen in space at times. She denies any associated dizziness or lightheadedness. Her speech has also progressively worsened, and she finds it difficult to get words out at times. She has no difficulty with  language or understanding others. She does describe coughing with eating a few times, but is not concerned with this.     She denies any focal weakness, numbness or tremors. She has had no vision changes, urinary or bowel issues. She has always had difficulty staying asleep which has remained unchanged, and denies being told that she acts out dreams. She denies any visual or auditory hallucinations, memory or other cognitive issues or changes in smell. She lives alone and is able to perform ADLs independently, but did get some equipment for her bathroom to help. She denies any recent travel - the last time she went out of state was May 2016. She reports a strong family history of CMT in her maternal second cousins. She denies a history of clumsiness and says she was able to keep up with her peers with running and play.    She has seen Dr. Galvez and Dr. Pop, with extensive lab workup, MRI brain and cervical spine, EEG and EMG. All of this has been unremarkable. She has been to physical therapy since June 2017 which she feels has had a mild benefit. She has tried no other therapies.      ROS:   Review of Systems   Constitutional: Negative for malaise/fatigue. Negative for weight loss.   Cardiovascular: Negative for chest pain and palpitations.   Gastrointestinal: Negative for nausea, vomiting and constipation.   Skin: Negative for rash.   Neurological: Negative for dizziness and tremors. Negative for focal weakness and seizures.   Endo/Heme/Allergies: Does not bruise/bleed easily.   Psychiatric/Behavioral: Negative for memory loss. Negative for depression and hallucinations. The patient is not nervous/anxious.      Medications    Current Outpatient Prescriptions:     aspirin (ECOTRIN) 81 MG EC tablet, Take 81 mg by mouth once daily., Disp: , Rfl:     carbidopa-levodopa  mg (SINEMET)  mg per tablet, Take 1 tablet by mouth 3 (three) times daily., Disp: 90 tablet, Rfl: 11    coenzyme Q10 (CO Q-10)  "100 mg capsule, Take 100 mg by mouth once daily., Disp: , Rfl:     hydroCHLOROthiazide (MICROZIDE) 12.5 mg capsule, Take 12.5 mg by mouth once daily. , Disp: , Rfl:     lisinopril (PRINIVIL,ZESTRIL) 40 MG tablet, Take 40 mg by mouth. , Disp: , Rfl:     multivitamin (ONE DAILY MULTIVITAMIN) per tablet, Take 1 tablet by mouth once daily., Disp: , Rfl:     Physical Exam  BP (!) 132/90   Pulse 82   Ht 5' 2" (1.575 m)   Wt 69.4 kg (153 lb)   BMI 27.98 kg/m²       Constitutional  Well-developed, well-nourished, appears stated age   Neurological    * Mental status      - Orientation  Oriented to person, place, time, and situation     - Attention/concentration  Attentive, vigilant during exam     - Executive  Well-organized thoughts     - Other     * Cranial nerves       - CN II  PERRL, visual fields full to confrontation     - CN III, IV, VI  Extraocular movements full. Restricted upgaze. Saccadic intrusions with rightward gaze. Upon down gaze, eyes made small adjustment with brief left deviation and subsequent correction to midline.     - CN V  Sensation V1 - V3 intact     - CN VII  Face strong and symmetric bilaterally     - CN VIII  Hearing reduced on the left     - CN IX, X  Palate raises midline and symmetric. No palatal tremor     - CN XI  SCM and trapezius 5/5 bilaterally     - CN XII  Tongue midline   * Speech  Mild scanning speech - improved. No dysarthria   * Motor  Tone increased throughout, somewhat clasp knife though limited by patient compliance. Muscle bulk normal, strength 4+/5 throughout BUE. Strength 4+/5 in bilateral hip flexors, otherwise 5/5 in BLE   * Sensory   Intact to temperature and light touch throughout. Vibration mildly reduced in bilateral lower extremities at toes.   * Coordination  No dysmetria with finger-to-nose. Bradykinetic with rapid movements, equal bilaterally.   * Gait  Mild difficulty with initiation and turning around, otherwise normal gait with rolling walker.    * Deep " tendon reflexes  3+ and symmetric throughout. +Be's bilaterally       Lab and Test Results    Results for EVI EM (MRN [id number]) as of 2/28/2018 08:03   Ref. Range 12/5/2017 08:40 12/27/2017 11:46   Arsenic Latest Ref Range: 0 - 12 ng/mL <1    Thiamine Latest Ref Range: 38 - 122 ug/L 57    Vitamin B-12 Latest Ref Range: 210 - 950 pg/mL 632    Glutamic Acid Decarb Ab Latest Ref Range: <=0.02 nmol/L 0.01    TSH Latest Ref Range: 0.400 - 4.000 uIU/mL 1.411        Results for EVI EM (MRN [id number]) as of 2/28/2018 08:03   Ref. Range 12/5/2017 08:40   AChR Binding Ab, Serum Latest Ref Range: <=0.02 nmol/L 0.01   AChR Ganglionic Neuronal Ab Latest Ref Range: <=0.02 nmol/L 0.00   NMO Interpretive Comments Unknown SEE BELOW   PAVAL reflex test added Unknown None.   Striated Muscle Ab Latest Ref Range: <1:120 titer Negative     Results for EVI EM (MRN [id number]) as of 2/28/2018 08:03   Ref. Range 12/5/2017 08:40   HTLV I/II Ab Unknown Negative   RPR Latest Ref Range: Non-reactive  Non-reactive     Results for EVI EM (MRN [id number]) as of 2/28/2018 08:03   Ref. Range 12/5/2017 08:40   C22:0 Latest Ref Range: <=96.3 nmol/mL 85.8   C24:0 Latest Ref Range: <=91.4 nmol/mL 66.3   C24:0/C22:0 Latest Ref Range: <=1.39 ratio 0.77   C26:0 Latest Ref Range: <=1.30 nmol/mL 0.59   C26:0/C22:0 Latest Ref Range: <=0.023 ratio 0.007   Long Chain Fatty Acids Unknown SEE BELOW   Phytanic Acid Latest Ref Range: <=9.88 nmol/mL 1.37   Pristanic Acid Latest Ref Range: <=2.98 nmol/mL 0.17   Pristanic/Phytanic Latest Ref Range: <=0.39 ratio 0.12     Results for EVI EM (MRN [id number]) as of 2/28/2018 08:03   Ref. Range 12/5/2017 08:40   Lead Latest Ref Range: 0.0 - 4.9 mcg/dL <1.0   JULIO C Screen Latest Ref Range: Negative <1:160  Negative <1:160   Cadmium Latest Ref Range: 0.0 - 4.9 ng/mL 0.4   Copper Latest Ref Range: 810 - 1990 ug/L 1444   CRMP-5 IgG Latest Ref Range: <1:240 titer Negative   Mercury Latest Ref Range: 0  "- 9 ng/mL <1   N-Type Calcium Channel Ab Latest Ref Range: <=0.03 nmol/L 0.00   P/Q Type Calcium Channel Ab Latest Ref Range: <=0.02 nmol/L 0.00   PAVAL BRINDA-1, Serum Latest Ref Range: <1:240 titer Negative   PAVAL BRINDA-2, Serum Latest Ref Range: <1:240 titer Negative   PAVAL BRINDA-3, Serum Latest Ref Range: <1:240 titer Negative   PAVAL AGNA-1, Serum Latest Ref Range: <1:240 titer Negative   PAVAL,  Amphiphysin Ab, Serum Latest Ref Range: <1:240 titer Negative   Neuronal (V-G) K+ Channel Ab, Serum Latest Ref Range: <=0.02 nmol/L 0.00   PAVAL, PCA-1, Serum Latest Ref Range: <1:240 titer Negative   PAVAL, PCA-2, Serum Latest Ref Range: <1:240 titer Negative   PAVAL, PCA-Tr, Serum Latest Ref Range: <1:240 titer Negative   Zinc, Serum-ALT Latest Ref Range: 60 - 130 ug/dL 86       Images: MRI Brain 12/2017  Midbrain atrophy. No acute or chronic infarcts.    Independently reviewed YES    MRI cervical spine  Multilevel degenerative changes of the cervical spine contributing to central canal stenosis or neural foraminal narrowing (C3/4, C5/6, C6/7)      Other Tests    EMG 2017  Reportedly normal - "no denervation in the head and neck"    Assessment and Plan    Tari Pablo is a 67 y.o. female who presents with progressive gait and speech abnormalities. On exam she is noted to have findings suggestive of Parkinsonism, including rigidity, bradykinesia and postural instability. Her gait was severely impaired, mostly with initiation and stopping. Though subtle, she also had some EOM findings with saccadic intrusions and compensatory movements for mildly weakened downgaze. Putting this together, she likely has Progressive Supranuclear Palsy which is supported by the mild midbrain atrophy seen on MRI as above. After starting sinemet, she reports that her symptoms have improved but not as significantly as she had hoped. After discussing options, we decided to increase her sinemet to 1.5 tablets three times per day. She will message " me in 4 days to let me know how this works. She was educated on the side effects, and will call/message with any issues.      I will follow up with her in about 3 months to see if she is having any benefit from the levodopa. All questions and concerns were addressed.      Problem List Items Addressed This Visit        Neuro    Progressive supranuclear palsy - Primary    Overview     August 2016 - speech slowing  Progressive gait disturbance         Current Assessment & Plan     -- improved with sinemet  -- increase to 1.5 tabs TID  -- if in 4 days she has no benefit without side effects, will continue to increase (ie 2 tabs TID)  -- if she experiences any side effects, will return to starting dose         Speech disorder    Current Assessment & Plan     -- evaluated by SLP  -- no long term therapy         Bradykinesia    Current Assessment & Plan     -- related to PSP  -- improving with sinemet            Other    Gait disturbance    Overview     Progressive worsening since end of 2016, falls backwards, has FOG         Current Assessment & Plan     -- continue PT and walker         Multiple falls    Overview     Bilateral wrist fractures (different times) requiring surgery.  Uses walker         Current Assessment & Plan     -- no falls since December 2017  -- continue PT and using walker                     Vianey Guan MD  Neurology Resident   [facility]  [address] [place] [address]  Pager: [phone]

## 2018-04-11 ENCOUNTER — TELEPHONE (OUTPATIENT)
Dept: NEUROLOGY | Facility: CLINIC | Age: 68
End: 2018-04-11

## 2018-04-11 PROBLEM — R25.8 BRADYKINESIA: Status: ACTIVE | Noted: 2018-04-11

## 2018-04-11 NOTE — TELEPHONE ENCOUNTER
----- Message from Shravan Enriquez sent at 4/11/2018  9:52 AM CDT -----  Contact: Self @ [phone]  Pt states Indira Love MRN [id number] is scheduled for an appt on 06/26, per the sheet she was given, but pt says that appt was for her, and she's needing to have it rescheduled to her chart. If that's possible, pls cancel the appt on 06/27 pt is scheduled for. Pt is asking for a call back once this is completed.

## 2018-04-11 NOTE — ASSESSMENT & PLAN NOTE
-- improved with sinemet  -- increase to 1.5 tabs TID  -- if in 4 days she has no benefit without side effects, will continue to increase (ie 2 tabs TID)  -- if she experiences any side effects, will return to starting dose

## 2018-04-14 ENCOUNTER — PATIENT MESSAGE (OUTPATIENT)
Dept: NEUROLOGY | Facility: CLINIC | Age: 68
End: 2018-04-14

## 2018-05-21 ENCOUNTER — PATIENT MESSAGE (OUTPATIENT)
Dept: NEUROLOGY | Facility: CLINIC | Age: 68
End: 2018-05-21

## 2018-05-21 RX ORDER — CARBIDOPA AND LEVODOPA 25; 100 MG/1; MG/1
1 TABLET ORAL 3 TIMES DAILY
Qty: 270 TABLET | Refills: 3 | Status: SHIPPED | OUTPATIENT
Start: 2018-05-21 | End: 2018-05-24 | Stop reason: SDUPTHER

## 2018-05-24 RX ORDER — CARBIDOPA AND LEVODOPA 25; 100 MG/1; MG/1
1.5 TABLET ORAL 3 TIMES DAILY
Qty: 270 TABLET | Refills: 3
Start: 2018-05-24 | End: 2018-05-24 | Stop reason: SDUPTHER

## 2018-05-24 RX ORDER — CARBIDOPA AND LEVODOPA 25; 100 MG/1; MG/1
2 TABLET ORAL 3 TIMES DAILY
Qty: 270 TABLET | Refills: 3
Start: 2018-05-24 | End: 2018-06-26 | Stop reason: SDUPTHER

## 2018-06-26 ENCOUNTER — OFFICE VISIT (OUTPATIENT)
Dept: NEUROLOGY | Facility: CLINIC | Age: 68
End: 2018-06-26
Payer: MEDICARE

## 2018-06-26 VITALS — HEART RATE: 85 BPM | DIASTOLIC BLOOD PRESSURE: 86 MMHG | HEIGHT: 62 IN | SYSTOLIC BLOOD PRESSURE: 124 MMHG

## 2018-06-26 DIAGNOSIS — G23.1 PROGRESSIVE SUPRANUCLEAR PALSY: ICD-10-CM

## 2018-06-26 DIAGNOSIS — R25.8 BRADYKINESIA: Primary | ICD-10-CM

## 2018-06-26 DIAGNOSIS — R29.6 MULTIPLE FALLS: ICD-10-CM

## 2018-06-26 DIAGNOSIS — R26.9 GAIT DISTURBANCE: ICD-10-CM

## 2018-06-26 PROCEDURE — 99213 OFFICE O/P EST LOW 20 MIN: CPT | Mod: S$PBB,GC,, | Performed by: STUDENT IN AN ORGANIZED HEALTH CARE EDUCATION/TRAINING PROGRAM

## 2018-06-26 PROCEDURE — 99999 PR PBB SHADOW E&M-EST. PATIENT-LVL III: CPT | Mod: PBBFAC,GC,, | Performed by: STUDENT IN AN ORGANIZED HEALTH CARE EDUCATION/TRAINING PROGRAM

## 2018-06-26 PROCEDURE — 99213 OFFICE O/P EST LOW 20 MIN: CPT | Mod: PBBFAC | Performed by: STUDENT IN AN ORGANIZED HEALTH CARE EDUCATION/TRAINING PROGRAM

## 2018-06-26 RX ORDER — CARBIDOPA AND LEVODOPA 25; 100 MG/1; MG/1
1 TABLET ORAL 3 TIMES DAILY
Qty: 270 TABLET | Refills: 3
Start: 2018-06-26 | End: 2018-07-05 | Stop reason: SDUPTHER

## 2018-06-26 RX ORDER — ROSUVASTATIN CALCIUM 10 MG/1
10 TABLET, COATED ORAL NIGHTLY
COMMUNITY
Start: 2018-06-15

## 2018-06-27 NOTE — PROGRESS NOTES
Patient Name: Tari Pablo  MRN: [id number]    CC: Gait difficulty    Interval history: Tari Pablo is a 67 y.o. female who returns for follow up. Since our last visit, she increased sinemet to 2 tablets (25-100mg) three times a day. She did not notice any change in her symptoms since increasing the sinemet, but still feels improved since before the medication. She still does not notice an on/off phenomenon, and denies any side effects. She has not noticed any new symptoms.       Clinic 4/10/18: Since our last visit she has started sinemet 25-100mg and is up to three times per day (8am, 2pm, 8pm). She says that she definitely has improved, but feels like she still has room to improve. During the first week she felt nauseous with the morning dose, but this has resolved and she is tolerating it well. She hasn't noticed any on/off phenomenon, but says she's the most fatigued around lunch time. She's unsure if her walking is any worse at that time. She denies any new symptoms. She continues to do physical therapy and is hopeful that her walking will continue to improve. She saw speech therapy and was told she didn't require regular follow up with them. She says she no longer gets her mail because her garage and drive way have some uneven ground which makes her feel unsteady.       HPI: Tari Pablo is a 67 y.o. female who presents for evaluation of difficulty with speech and walking. She is accompanied by her brother and sister who assist with history. Her symptoms were first noticed around August 2016, when her sister's friend who is a speech pathologist noticed that her speech was slow. Since then she has had progressive difficulty with walking, and now requires a walker for support. She describes it mostly as difficulty getting started and stopping. She has had multiple falls related to tripping, or when she is caught off guard and loses balance (eg. One morning she went out to get the mail and a wasp flew out which  startled her and she fell). This has resulted in bilateral wrist fractures at separate times, both requiring surgery. She also describes feeling frozen in space at times. She denies any associated dizziness or lightheadedness. Her speech has also progressively worsened, and she finds it difficult to get words out at times. She has no difficulty with language or understanding others. She does describe coughing with eating a few times, but is not concerned with this.     She denies any focal weakness, numbness or tremors. She has had no vision changes, urinary or bowel issues. She has always had difficulty staying asleep which has remained unchanged, and denies being told that she acts out dreams. She denies any visual or auditory hallucinations, memory or other cognitive issues or changes in smell. She lives alone and is able to perform ADLs independently, but did get some equipment for her bathroom to help. She denies any recent travel - the last time she went out of state was May 2016. She reports a strong family history of CMT in her maternal second cousins. She denies a history of clumsiness and says she was able to keep up with her peers with running and play.    She has seen Dr. Galvez and Dr. Pop, with extensive lab workup, MRI brain and cervical spine, EEG and EMG. All of this has been unremarkable. She has been to physical therapy since June 2017 which she feels has had a mild benefit. She has tried no other therapies.      ROS:   Review of Systems   Constitutional: Negative for malaise/fatigue. Negative for weight loss.   Cardiovascular: Negative for chest pain and palpitations.   Gastrointestinal: Negative for nausea, vomiting and constipation.    Endo/Heme/Allergies: Does not bruise/bleed easily.   Psychiatric/Behavioral: Negative for memory loss. Negative for depression and hallucinations. The patient is not nervous/anxious.      Medications    Current Outpatient Prescriptions:     aspirin  "(ECOTRIN) 81 MG EC tablet, Take 81 mg by mouth once daily., Disp: , Rfl:     carbidopa-levodopa  mg (SINEMET)  mg per tablet, Take 1 tablet by mouth 3 (three) times daily., Disp: 270 tablet, Rfl: 3    coenzyme Q10 (CO Q-10) 100 mg capsule, Take 100 mg by mouth once daily., Disp: , Rfl:     hydroCHLOROthiazide (MICROZIDE) 12.5 mg capsule, Take 12.5 mg by mouth once daily. , Disp: , Rfl:     lisinopril (PRINIVIL,ZESTRIL) 40 MG tablet, Take 40 mg by mouth. , Disp: , Rfl:     multivitamin (ONE DAILY MULTIVITAMIN) per tablet, Take 1 tablet by mouth once daily., Disp: , Rfl:     rosuvastatin (CRESTOR) 10 MG tablet, , Disp: , Rfl:     Physical Exam  /86   Pulse 85   Ht 5' 2" (1.575 m)       Constitutional  Well-developed, well-nourished, appears stated age   Neurological    * Mental status      - Orientation  Oriented to person, place, time, and situation     - Attention/concentration  Attentive, vigilant during exam     - Executive  Well-organized thoughts     - Other     * Cranial nerves       - CN II  PERRL, visual fields full to confrontation     - CN III, IV, VI  Extraocular movements full. Restricted upgaze. Saccadic intrusions with rightward gaze. Upon down gaze, eyes made small adjustment with brief left deviation and subsequent correction to midline.     - CN V  Sensation V1 - V3 intact     - CN VII  Face strong and symmetric bilaterally     - CN VIII  Hearing reduced on the left     - CN IX, X  Palate raises midline and symmetric. No palatal tremor     - CN XI  SCM and trapezius 5/5 bilaterally     - CN XII  Tongue midline   * Speech  Mild scanning speech - stable. No dysarthria   * Motor  Tone mildly increased throughout - improved. Muscle bulk normal, strength 4+/5 throughout BUE. Strength 4+/5 in bilateral hip flexors, otherwise 5/5 in BLE   * Sensory   Intact to temperature and light touch throughout. Vibration mildly reduced in bilateral lower extremities at toes.   * Coordination  " No dysmetria with finger-to-nose. Bradykinetic with rapid movements, equal bilaterally.   * Gait  Mild difficulty with initiation and turning around, otherwise normal gait with rolling walker.    * Deep tendon reflexes  Not tested       Lab and Test Results    Results for EVI EM (MRN [id number]) as of 2/28/2018 08:03   Ref. Range 12/5/2017 08:40 12/27/2017 11:46   Arsenic Latest Ref Range: 0 - 12 ng/mL <1    Thiamine Latest Ref Range: 38 - 122 ug/L 57    Vitamin B-12 Latest Ref Range: 210 - 950 pg/mL 632    Glutamic Acid Decarb Ab Latest Ref Range: <=0.02 nmol/L 0.01    TSH Latest Ref Range: 0.400 - 4.000 uIU/mL 1.411        Results for EVI EM (MRN [id number]) as of 2/28/2018 08:03   Ref. Range 12/5/2017 08:40   AChR Binding Ab, Serum Latest Ref Range: <=0.02 nmol/L 0.01   AChR Ganglionic Neuronal Ab Latest Ref Range: <=0.02 nmol/L 0.00   NMO Interpretive Comments Unknown SEE BELOW   PAVAL reflex test added Unknown None.   Striated Muscle Ab Latest Ref Range: <1:120 titer Negative     Results for EVI EM (MRN [id number]) as of 2/28/2018 08:03   Ref. Range 12/5/2017 08:40   HTLV I/II Ab Unknown Negative   RPR Latest Ref Range: Non-reactive  Non-reactive     Results for EVI EM (MRN [id number]) as of 2/28/2018 08:03   Ref. Range 12/5/2017 08:40   C22:0 Latest Ref Range: <=96.3 nmol/mL 85.8   C24:0 Latest Ref Range: <=91.4 nmol/mL 66.3   C24:0/C22:0 Latest Ref Range: <=1.39 ratio 0.77   C26:0 Latest Ref Range: <=1.30 nmol/mL 0.59   C26:0/C22:0 Latest Ref Range: <=0.023 ratio 0.007   Long Chain Fatty Acids Unknown SEE BELOW   Phytanic Acid Latest Ref Range: <=9.88 nmol/mL 1.37   Pristanic Acid Latest Ref Range: <=2.98 nmol/mL 0.17   Pristanic/Phytanic Latest Ref Range: <=0.39 ratio 0.12     Results for MANAVEVI WALLER (MRN [id number]) as of 2/28/2018 08:03   Ref. Range 12/5/2017 08:40   Lead Latest Ref Range: 0.0 - 4.9 mcg/dL <1.0   JULIO C Screen Latest Ref Range: Negative <1:160  Negative <1:160   Cadmium Latest  "Ref Range: 0.0 - 4.9 ng/mL 0.4   Copper Latest Ref Range: 810 - 1990 ug/L 1444   CRMP-5 IgG Latest Ref Range: <1:240 titer Negative   Mercury Latest Ref Range: 0 - 9 ng/mL <1   N-Type Calcium Channel Ab Latest Ref Range: <=0.03 nmol/L 0.00   P/Q Type Calcium Channel Ab Latest Ref Range: <=0.02 nmol/L 0.00   PAVAL BRINDA-1, Serum Latest Ref Range: <1:240 titer Negative   PAVAL BRINDA-2, Serum Latest Ref Range: <1:240 titer Negative   PAVAL BRINDA-3, Serum Latest Ref Range: <1:240 titer Negative   PAVAL AGNA-1, Serum Latest Ref Range: <1:240 titer Negative   PAVAL,  Amphiphysin Ab, Serum Latest Ref Range: <1:240 titer Negative   Neuronal (V-G) K+ Channel Ab, Serum Latest Ref Range: <=0.02 nmol/L 0.00   PAVAL, PCA-1, Serum Latest Ref Range: <1:240 titer Negative   PAVAL, PCA-2, Serum Latest Ref Range: <1:240 titer Negative   PAVAL, PCA-Tr, Serum Latest Ref Range: <1:240 titer Negative   Zinc, Serum-ALT Latest Ref Range: 60 - 130 ug/dL 86       Images: MRI Brain 12/2017  Midbrain atrophy. No acute or chronic infarcts.    Independently reviewed YES    MRI cervical spine  Multilevel degenerative changes of the cervical spine contributing to central canal stenosis or neural foraminal narrowing (C3/4, C5/6, C6/7)      Other Tests    EMG 2017  Reportedly normal - "no denervation in the head and neck"    Assessment and Plan    Tari Pablo is a 67 y.o. female who presents with progressive gait and speech abnormalities. On exam she is noted to have findings suggestive of Parkinsonism, including rigidity, bradykinesia and postural instability. Her gait was severely impaired, mostly with initiation and stopping. Though subtle, she also had some EOM findings with saccadic intrusions and compensatory movements for mildly weakened downgaze. Putting this together, she likely has Progressive Supranuclear Palsy which is supported by the mild midbrain atrophy seen on MRI as above.     She will continue her current dose of sinemet for now, and " we discussed the possibility of a clinical trial. She was interested so our research coordinator met with her to discuss the trial.      I will follow up with her in about 6 weeks.       Problem List Items Addressed This Visit        Neuro    Progressive supranuclear palsy    Overview     August 2016 - speech slowing  Progressive gait disturbance         Current Assessment & Plan     -- continue sinemet 2 tablets TID  -- consider entacapone if needed         Bradykinesia - Primary       Other    Gait disturbance    Overview     Progressive worsening since end of 2016, falls backwards, has FOG         Current Assessment & Plan     -- continue walker  -- PT ending  -- may need further therapy in future         Multiple falls    Overview     Bilateral wrist fractures (different times) requiring surgery.  Uses walker         Current Assessment & Plan     -- using walker  -- no falls since December 2017                     Vianey Guan MD  Neurology Resident   [facility] [address] [place] [address]  Pager: [phone]

## 2018-07-01 ENCOUNTER — PATIENT MESSAGE (OUTPATIENT)
Dept: NEUROLOGY | Facility: CLINIC | Age: 68
End: 2018-07-01

## 2018-07-05 ENCOUNTER — PATIENT MESSAGE (OUTPATIENT)
Dept: NEUROLOGY | Facility: CLINIC | Age: 68
End: 2018-07-05

## 2018-07-05 RX ORDER — CARBIDOPA AND LEVODOPA 25; 100 MG/1; MG/1
2 TABLET ORAL 3 TIMES DAILY
Qty: 270 TABLET | Refills: 3
Start: 2018-07-05 | End: 2018-07-10 | Stop reason: SDUPTHER

## 2018-07-10 ENCOUNTER — PATIENT MESSAGE (OUTPATIENT)
Dept: NEUROLOGY | Facility: CLINIC | Age: 68
End: 2018-07-10

## 2018-07-10 ENCOUNTER — OFFICE VISIT (OUTPATIENT)
Dept: NEUROLOGY | Facility: CLINIC | Age: 68
End: 2018-07-10
Payer: MEDICARE

## 2018-07-10 VITALS
SYSTOLIC BLOOD PRESSURE: 122 MMHG | WEIGHT: 154.31 LBS | HEIGHT: 62 IN | HEART RATE: 84 BPM | BODY MASS INDEX: 28.39 KG/M2 | RESPIRATION RATE: 14 BRPM | DIASTOLIC BLOOD PRESSURE: 74 MMHG

## 2018-07-10 DIAGNOSIS — F48.2 PSEUDOBULBAR AFFECT: ICD-10-CM

## 2018-07-10 DIAGNOSIS — G23.1 PROGRESSIVE SUPRANUCLEAR PALSY: Primary | ICD-10-CM

## 2018-07-10 PROCEDURE — 99213 OFFICE O/P EST LOW 20 MIN: CPT | Mod: PBBFAC | Performed by: PSYCHIATRY & NEUROLOGY

## 2018-07-10 PROCEDURE — 99999 PR STA SHADOW: CPT | Mod: PBBFAC,,, | Performed by: PSYCHIATRY & NEUROLOGY

## 2018-07-10 PROCEDURE — 99213 OFFICE O/P EST LOW 20 MIN: CPT | Mod: S$PBB | Performed by: PSYCHIATRY & NEUROLOGY

## 2018-07-10 PROCEDURE — 99999 PR PBB SHADOW E&M-EST. PATIENT-LVL III: CPT | Mod: PBBFAC,,, | Performed by: PSYCHIATRY & NEUROLOGY

## 2018-07-10 RX ORDER — CARBIDOPA AND LEVODOPA 25; 100 MG/1; MG/1
2 TABLET ORAL 3 TIMES DAILY
Qty: 540 TABLET | Refills: 3
Start: 2018-07-10 | End: 2018-10-24

## 2018-07-10 RX ORDER — CARBIDOPA AND LEVODOPA 25; 100 MG/1; MG/1
2 TABLET ORAL 3 TIMES DAILY
Qty: 540 TABLET | Refills: 3
Start: 2018-07-10 | End: 2018-07-10 | Stop reason: SDUPTHER

## 2018-07-10 NOTE — PROGRESS NOTES
HPI:  Tari Pablo is a 67 y.o. female with several months of speech and gait changes. Exam shows scanning cerebellar speech and myelopathic/ataxic signs as well right sided UMN signs with increased spasticity. Questioning  Neurodegenerative process like PD plus syndrome (CBGD, OPCD) vs spinal cerebellar ataxia. MRI brain and C spine unremarkable. Note development of tremor more recently.       The patient is here for her 6 months follow up. She saw Dr Newsome, movement disorders specialist and has been diagnosed with progressive supranuclear palsy. Sinemet was started with some mild benefit. The dose was raised and she did not see any further benefit and she was told to reduce to TID dose but felt she worsened on lower dose and is back on 2 tablets TID as this clearly helps her gait/ balance. She was offered clinical trial but declined  She notices a tremor in the legs only currently. Her vision currently is good.   Mood is good. No crying. Sent to endocrinology for thyroid nodules- will be followed in one year with this provider  She does have tossing in her sleep but not yelling or falling out of the bed.     Review of Systems   Constitutional: Negative for fever.   HENT: Negative for nosebleeds.    Eyes: Negative for double vision.   Respiratory: Negative for hemoptysis.    Cardiovascular: Negative for leg swelling.   Gastrointestinal: Negative for blood in stool.   Genitourinary: Negative for hematuria.   Musculoskeletal: Negative for falls and neck pain.   Skin: Negative for rash.   Psychiatric/Behavioral: Negative for depression.         I have reviewed all of this patient's past medical and surgical histories as well as family and social histories and active allergies and medications as documented in the electronic medical record.    Exam:  Gen Appearance, well developed/nourished in no apparent distress  CV: 2+ distal pulses with no edema or swelling  Neuro:  MS: Awake, alert,Sustains attention.  Recent/remote memory intact, Language is full to spontaneous speech/comprehension. Fund of Knowledge is full but patient's speech seems mildly slow, slurred, and scanning- cerebellar speech deficit which is subtle.   She has mild emotional incontinence once today when speaking of a joyful event  CN: Optic discs are flat with normal vasculature, PERRL, Extraoccular movements and visual fields are full. Normal facial sensation and strength, Hearing symmetric, Tongue and Palate are midline and strong. Shoulder Shrug symmetric and strong.  Motor: Normal bulk, tone is increased to spasticity in the legs more than arms and more spasticity on the right today, no abnormal movement at rest but tremor of outstretched.  5/5 strength bilateral upper/lower extremities with 2+ reflexes in the arms and 3+ in the legs with right foot clonus    There is reduced blink and reduced facial expression  Sensory: Romberg negative  Cerebellar: Finger-nose,Heal-shin, Rapid alternating movements intact  Gait: stance is mildly wide based, there is moderate  Ataxia and shuffling stable since the last visit There is mild impairment of postural reflexes.     Imaging: MRI brain repeat 2017 (repeated after 2017 exam: Age-appropriate generalized volume loss with scattered T2/FLAIR signal abnormality within the supratentorial white matter  while nonspecific suggestive for moderate degree of  chronic microvascular ischemic change.    No evidence for acute infarction or enhancing lesion.    MRI C spine: Multilevel degenerative changes of the cervical spine contributing to central canal stenosis or neural foraminal narrowing as detailed in the above report.    Atrophy of the thyroid gland with bilateral thyroid nodules.  Consider further evaluation with thyroid ultrasound as clinically warranted      Carotid US: less than 40% stenosis 2017    EE: normal    EMG 2017: no denervation in the head and neck     Thyroid US 2018: Multinodular  thyroid disease as noted above.    Labs: 2017: B12, HTLV antibodies,CK, RPR, Heavy metal screening, Thiamine, Zinc, Copper, VLFA, JULIO C, TSH, CMP, CBC, Paraneoplastic antibodies and anti-ALEXY antibodies    12/2017 UA done pre-op for wrist surgery: Trace of blood (non-cath specimen)- repeat UA in 2018: no blood    Assessment/Plan: Tari Pablo is a 67 y.o. female with several months of speech and gait changes. Exam shows scanning cerebellar speech and myelopathic/ataxic signs as well right sided UMN signs with increased spasticity. Questioning  Neurodegenerative process like PD plus syndrome (CBGD, OPCD) vs spinal cerebellar ataxia. MRI brain and C spine unremarkable. Note development of tremor more recently.   I recommend:     1. . Seeing movement disorders neurologist about symptoms thought to be progress supranuclear palsy. Needs updated Rx of Sinemet with correct quantity - done by me today  Genetic testing denied prior  2. Fall precautions reviewed. Falls currently resolved  3. Watch pseudobulbar affect over time. She declines treatment at this point      RTC with me PRN- currently seeing movement disorders specialist at this point.

## 2018-07-11 ENCOUNTER — PATIENT MESSAGE (OUTPATIENT)
Dept: NEUROLOGY | Facility: CLINIC | Age: 68
End: 2018-07-11

## 2018-07-15 ENCOUNTER — PATIENT MESSAGE (OUTPATIENT)
Dept: NEUROLOGY | Facility: CLINIC | Age: 68
End: 2018-07-15

## 2018-09-04 ENCOUNTER — TELEPHONE (OUTPATIENT)
Dept: NEUROLOGY | Facility: CLINIC | Age: 68
End: 2018-09-04

## 2018-09-04 NOTE — TELEPHONE ENCOUNTER
----- Message from Mera Mohan sent at 9/4/2018  8:04 AM CDT -----  Contact: pt @ [phone]  Patient canceled appt 9/5 asking to reschedule. No available appts in the system. Please call to schedule.

## 2018-09-17 ENCOUNTER — PATIENT MESSAGE (OUTPATIENT)
Dept: NEUROLOGY | Facility: CLINIC | Age: 68
End: 2018-09-17

## 2018-10-24 ENCOUNTER — OFFICE VISIT (OUTPATIENT)
Dept: NEUROLOGY | Facility: CLINIC | Age: 68
End: 2018-10-24
Payer: MEDICARE

## 2018-10-24 VITALS
DIASTOLIC BLOOD PRESSURE: 79 MMHG | HEIGHT: 62 IN | WEIGHT: 153.69 LBS | BODY MASS INDEX: 28.28 KG/M2 | SYSTOLIC BLOOD PRESSURE: 136 MMHG | HEART RATE: 95 BPM

## 2018-10-24 DIAGNOSIS — G23.1 PSP (PROGRESSIVE SUPRANUCLEAR PALSY): Primary | ICD-10-CM

## 2018-10-24 PROCEDURE — 99999 PR PBB SHADOW E&M-EST. PATIENT-LVL III: CPT | Mod: PBBFAC,,, | Performed by: PSYCHIATRY & NEUROLOGY

## 2018-10-24 PROCEDURE — 99213 OFFICE O/P EST LOW 20 MIN: CPT | Mod: PBBFAC | Performed by: PSYCHIATRY & NEUROLOGY

## 2018-10-24 PROCEDURE — 99215 OFFICE O/P EST HI 40 MIN: CPT | Mod: S$PBB,,, | Performed by: PSYCHIATRY & NEUROLOGY

## 2018-10-24 RX ORDER — CARBIDOPA AND LEVODOPA 25; 250 MG/1; MG/1
1 TABLET ORAL 3 TIMES DAILY
Qty: 270 TABLET | Refills: 3 | Status: SHIPPED | OUTPATIENT
Start: 2018-10-24 | End: 2019-07-02 | Stop reason: SDUPTHER

## 2018-10-24 NOTE — PROGRESS NOTES
Patient Name: Tari Pablo  MRN: [id number]    CC: Gait difficulty    Interval history : here with her brother today  - 2 falls sine last visit, neither injuries but used cellphone to call folks (has [employer] also).  - speech has stabilized   - no swallow issues now  - not doing PT now, kows what to do from previous exercising  - restless with sleeping at night and very little difficulty falling asleep after urinating  - interested in clinical research or studies  - would take more meds too      From June 2018: Since our last visit, she increased sinemet to 2 tablets (25-100mg) three times a day. She did not notice any change in her symptoms since increasing the sinemet, but still feels improved since before the medication. She still does not notice an on/off phenomenon, and denies any side effects. She has not noticed any new symptoms.       Clinic 4/10/18: Since our last visit she has started sinemet 25-100mg and is up to three times per day (8am, 2pm, 8pm). She says that she definitely has improved, but feels like she still has room to improve. During the first week she felt nauseous with the morning dose, but this has resolved and she is tolerating it well. She hasn't noticed any on/off phenomenon, but says she's the most fatigued around lunch time. She's unsure if her walking is any worse at that time. She denies any new symptoms. She continues to do physical therapy and is hopeful that her walking will continue to improve. She saw speech therapy and was told she didn't require regular follow up with them. She says she no longer gets her mail because her garage and drive way have some uneven ground which makes her feel unsteady.       HPI: Tari Pablo is a 67 y.o. female who presents for evaluation of difficulty with speech and walking. She is accompanied by her brother and sister who assist with history. Her symptoms were first noticed around August 2016, when her sister's friend who is a speech pathologist  noticed that her speech was slow. Since then she has had progressive difficulty with walking, and now requires a walker for support. She describes it mostly as difficulty getting started and stopping. She has had multiple falls related to tripping, or when she is caught off guard and loses balance (eg. One morning she went out to get the mail and a wasp flew out which startled her and she fell). This has resulted in bilateral wrist fractures at separate times, both requiring surgery. She also describes feeling frozen in space at times. She denies any associated dizziness or lightheadedness. Her speech has also progressively worsened, and she finds it difficult to get words out at times. She has no difficulty with language or understanding others. She does describe coughing with eating a few times, but is not concerned with this.     She denies any focal weakness, numbness or tremors. She has had no vision changes, urinary or bowel issues. She has always had difficulty staying asleep which has remained unchanged, and denies being told that she acts out dreams. She denies any visual or auditory hallucinations, memory or other cognitive issues or changes in smell. She lives alone and is able to perform ADLs independently, but did get some equipment for her bathroom to help. She denies any recent travel - the last time she went out of state was May 2016. She reports a strong family history of CMT in her maternal second cousins. She denies a history of clumsiness and says she was able to keep up with her peers with running and play.    She has seen Dr. Galvez and Dr. Pop, with extensive lab workup, MRI brain and cervical spine, EEG and EMG. All of this has been unremarkable. She has been to physical therapy since June 2017 which she feels has had a mild benefit. She has tried no other therapies.      ROS:   Review of Systems   Constitutional: Negative for malaise/fatigue. Negative for weight loss.  "  Cardiovascular: Negative for chest pain and palpitations.   Gastrointestinal: Negative for nausea, vomiting and constipation.    Endo/Heme/Allergies: Does not bruise/bleed easily.   Psychiatric/Behavioral: Negative for memory loss. Negative for depression and hallucinations. The patient is not nervous/anxious.      Medications    Current Outpatient Medications:     aspirin (ECOTRIN) 81 MG EC tablet, Take 81 mg by mouth once daily., Disp: , Rfl:     carbidopa-levodopa  mg (SINEMET)  mg per tablet, Take 2 tablets by mouth 3 (three) times daily., Disp: 540 tablet, Rfl: 3    coenzyme Q10 (CO Q-10) 100 mg capsule, Take 100 mg by mouth once daily., Disp: , Rfl:     hydroCHLOROthiazide (MICROZIDE) 12.5 mg capsule, Take 12.5 mg by mouth once daily. , Disp: , Rfl:     lisinopril (PRINIVIL,ZESTRIL) 40 MG tablet, Take 40 mg by mouth. , Disp: , Rfl:     multivitamin (ONE DAILY MULTIVITAMIN) per tablet, Take 1 tablet by mouth once daily., Disp: , Rfl:     rosuvastatin (CRESTOR) 10 MG tablet, Take 10 mg by mouth every evening. , Disp: , Rfl:     Physical Exam  /79   Pulse 95   Ht 5' 2" (1.575 m)   Wt 69.7 kg (153 lb 10.6 oz)   BMI 28.10 kg/m²       Constitutional  Well-developed, well-nourished, appears stated age   Neurological    * Mental status      - Orientation  Oriented to person, place, time, and situation     - Attention/concentration  Attentive, vigilant during exam     - Executive  Well-organized thoughts     - Other     * Cranial nerves       - CN II  PERRL, visual fields full to confrontation     - CN III, IV, VI  Extraocular movements full. Restricted upgaze. Saccadic intrusions with rightward gaze. Upon down gaze, eyes made small adjustment with brief left deviation and subsequent correction to midline.     - CN V  Sensation V1 - V3 intact     - CN VII  Face strong and symmetric bilaterally     - CN VIII  Hearing reduced on the left     - CN IX, X  Palate raises midline and symmetric. " "No palatal tremor     - CN XI  SCM and trapezius 5/5 bilaterally     - CN XII  Tongue midline   * Speech  Mild scanning speech - stable. No dysarthria   * Motor  Tone mildly increased throughout - improved. Muscle bulk normal, strength 4+/5 throughout BUE. Strength 4+/5 in bilateral hip flexors, otherwise 5/5 in BLE   * Sensory   Intact to temperature and light touch throughout. Vibration mildly reduced in bilateral lower extremities at toes.   * Coordination  No dysmetria with finger-to-nose. Bradykinetic with rapid movements, equal bilaterally.   * Gait  Mild difficulty with initiation and turning around, otherwise normal gait with rolling walker.    * Deep tendon reflexes  Not tested       Lab and Test Results    Images: MRI Brain 12/2017  Midbrain atrophy. No acute or chronic infarcts.    Independently reviewed YES    MRI cervical spine  Multilevel degenerative changes of the cervical spine contributing to central canal stenosis or neural foraminal narrowing (C3/4, C5/6, C6/7)      Other Tests    EMG 2017  Reportedly normal - "no denervation in the head and neck"    Assessment and Plan    Tari Pablo is a 68 y.o. female who presents with progressive gait and speech abnormalities. On exam she is noted to have findings suggestive of Parkinsonism, including rigidity, bradykinesia and postural instability. Her gait was severely impaired, mostly with initiation and stopping. Though subtle, she also had some EOM findings with saccadic intrusions and compensatory movements for mildly weakened downgaze. Putting this together, she likely has Progressive Supranuclear Palsy which is supported by the mild midbrain atrophy seen on MRI as above.     Has improved a bit     - cd/ld 25/250 TID now  - may consider clinical trials in the future  - will follow-up with Dr. Donn Newsome MD, MPH  Division of Movement and Memory Disorders  [facility]      "

## 2019-01-23 ENCOUNTER — OFFICE VISIT (OUTPATIENT)
Dept: NEUROLOGY | Facility: CLINIC | Age: 69
End: 2019-01-23
Payer: MEDICARE

## 2019-01-23 VITALS
BODY MASS INDEX: 28.16 KG/M2 | HEART RATE: 88 BPM | HEIGHT: 62 IN | SYSTOLIC BLOOD PRESSURE: 126 MMHG | RESPIRATION RATE: 14 BRPM | DIASTOLIC BLOOD PRESSURE: 96 MMHG | WEIGHT: 153 LBS

## 2019-01-23 DIAGNOSIS — R26.9 GAIT DISTURBANCE: ICD-10-CM

## 2019-01-23 DIAGNOSIS — E78.5 HYPERLIPIDEMIA, UNSPECIFIED HYPERLIPIDEMIA TYPE: ICD-10-CM

## 2019-01-23 DIAGNOSIS — G23.1 PROGRESSIVE SUPRANUCLEAR PALSY: Primary | ICD-10-CM

## 2019-01-23 DIAGNOSIS — R25.8 BRADYKINESIA: ICD-10-CM

## 2019-01-23 DIAGNOSIS — R47.9 SPEECH DISORDER: ICD-10-CM

## 2019-01-23 DIAGNOSIS — I10 HYPERTENSION, UNSPECIFIED TYPE: ICD-10-CM

## 2019-01-23 PROCEDURE — 99213 OFFICE O/P EST LOW 20 MIN: CPT | Mod: PBBFAC | Performed by: NURSE PRACTITIONER

## 2019-01-23 PROCEDURE — 99999 PR PBB SHADOW E&M-EST. PATIENT-LVL III: CPT | Mod: PBBFAC,,, | Performed by: NURSE PRACTITIONER

## 2019-01-23 PROCEDURE — 99999 PR STA SHADOW: CPT | Mod: PBBFAC,,, | Performed by: NURSE PRACTITIONER

## 2019-01-23 PROCEDURE — 99999 PR STA SHADOW: ICD-10-PCS | Mod: PBBFAC,,, | Performed by: NURSE PRACTITIONER

## 2019-01-23 PROCEDURE — 99214 OFFICE O/P EST MOD 30 MIN: CPT | Mod: S$PBB | Performed by: NURSE PRACTITIONER

## 2019-01-23 NOTE — PROGRESS NOTES
HPI: Tari Pablo is a 68 y.o. female with speech and gait changes. Movement disorders at [facility] [place] diagnosed her with Progressive Supranuclear Palsy in 2018. Exam shows scanning cerebellar speech and myelopathic/ataxic signs as well right sided UMN signs with increased spasticity. MRI brain and C spine unremarkable.     She presents today for a follow up. She saw Dr. Newsome in 10/2018, who changed her Sinemet from two tablets of 25/100 tid to 1 tablet of 25/250 tid. She states that she does not notice any difference with this dose, but has had no further falls.     Denies tremor at this time, and states that she has never had a tremor, despite mention of a leg tremor in her chart. Denies visual, speech, or swallowing complaints. No memory loss.     She lives alone, and performs ADL's well independently. Continues with exercises at home, previously instructed per PT.     She continues to decline clinical trial offered to her per movement disorders.     She tosses in her sleep, but does not yell or fall out of the bed. She wakes up well rested.     Mood is good.     Review of Systems   Constitutional: Negative for fever.   HENT: Negative for nosebleeds.    Eyes: Negative for blurred vision and double vision.   Respiratory: Negative for hemoptysis.    Cardiovascular: Negative for leg swelling.   Gastrointestinal: Negative for blood in stool.   Genitourinary: Negative for hematuria.   Musculoskeletal: Negative for falls and neck pain.   Skin: Negative for rash.   Neurological: Negative for dizziness, tremors, speech change, focal weakness and headaches.   Psychiatric/Behavioral: Negative for depression, hallucinations and memory loss. The patient is not nervous/anxious and does not have insomnia.        I have reviewed all of this patient's past medical and surgical histories as well as family and social histories and active allergies and medications as documented in the electronic medical record.    Exam:  Gen Appearance,  well developed/nourished in no apparent distress  CV: 2+ distal pulses with no edema or swelling  Neuro:  MS: Awake, alert,Sustains attention. Recent/remote memory intact, Language is full to spontaneous speech/comprehension. Fund of Knowledge is full but patient's speech seems mildly slow, slurred, and scanning- cerebellar speech deficit which is subtle. No pseudobulbar affect seen today.   CN: Optic discs are flat with normal vasculature, PERRL, Extraoccular movements and visual fields are full. Normal facial sensation and strength, Hearing symmetric, Tongue and Palate are midline and strong. Shoulder Shrug symmetric and strong.  Motor: Normal bulk, tone is increased to spasticity in the legs more than arms and more spasticity on the right today, no abnormal movement at rest but tremor of outstretched.  5/5 strength bilateral upper/lower extremities with 2+ reflexes in the arms and 3+ in the legs with right foot clonus    There is reduced blink and reduced facial expression  Sensory: Romberg negative  Cerebellar: Finger-nose,Heal-shin, Rapid alternating movements intact  Gait: stance is mildly wide based, there is moderate  Ataxia and shuffling stable since the last visit There is mild impairment of postural reflexes; ambulates with a walker     Imaging:   MRI brain repeat 12/2017 (repeated after 4/2017 exam: Age-appropriate generalized volume loss with scattered T2/FLAIR signal abnormality within the supratentorial white matter  while nonspecific suggestive for moderate degree of  chronic microvascular ischemic change.    No evidence for acute infarction or enhancing lesion.    MRI C spine: Multilevel degenerative changes of the cervical spine contributing to central canal stenosis or neural foraminal narrowing as detailed in the above report.    Atrophy of the thyroid gland with bilateral thyroid nodules.  Consider further evaluation with thyroid ultrasound as clinically warranted      Carotid US: less than 40%  stenosis 2017    EE: normal    EMG 2017: no denervation in the head and neck     Thyroid US 2018: Multinodular thyroid disease as noted above.    Labs: 2017: B12, HTLV antibodies,CK, RPR, Heavy metal screening, Thiamine, Zinc, Copper, VLFA, JULIO C, TSH, CMP, CBC, Paraneoplastic antibodies and anti-ALEXY antibodies    2017 UA done pre-op for wrist surgery: Trace of blood (non-cath specimen)- repeat UA in 2018: no blood    Assessment/Plan: Tari Pablo is a 68 y.o. female with speech and gait changes. [place] disorders [place] [facility] [place] her with Progressive Supranuclear Palsy in 2018. Exam shows scanning cerebellar speech and myelopathic/ataxic signs as well right sided UMN signs with increased spasticity. MRI brain and C spine unremarkable.   I recommend:     1. Sawmovement disorders neurologist about symptoms thought to be progress supranuclear palsy. Continues on Sinemet. Will follow up with movement disorders prn per patient.   2. Fall precautions reviewed. Falls currently resolved.   3. Monitor pseudobulbar affect over time. She declines treatment at this point.   4. Could repeat PT with any worsening of her gait.     FU 6 months.

## 2019-07-02 ENCOUNTER — OFFICE VISIT (OUTPATIENT)
Dept: NEUROLOGY | Facility: CLINIC | Age: 69
End: 2019-07-02
Payer: MEDICARE

## 2019-07-02 VITALS
HEIGHT: 62 IN | RESPIRATION RATE: 16 BRPM | HEART RATE: 99 BPM | DIASTOLIC BLOOD PRESSURE: 64 MMHG | BODY MASS INDEX: 27.87 KG/M2 | WEIGHT: 151.44 LBS | SYSTOLIC BLOOD PRESSURE: 118 MMHG

## 2019-07-02 DIAGNOSIS — G23.1 PROGRESSIVE SUPRANUCLEAR PALSY: Primary | ICD-10-CM

## 2019-07-02 PROCEDURE — 99213 OFFICE O/P EST LOW 20 MIN: CPT | Mod: PBBFAC | Performed by: PSYCHIATRY & NEUROLOGY

## 2019-07-02 PROCEDURE — 99213 OFFICE O/P EST LOW 20 MIN: CPT | Mod: S$PBB | Performed by: PSYCHIATRY & NEUROLOGY

## 2019-07-02 PROCEDURE — 99999 PR PBB SHADOW E&M-EST. PATIENT-LVL III: CPT | Mod: PBBFAC,,, | Performed by: PSYCHIATRY & NEUROLOGY

## 2019-07-02 PROCEDURE — 99999 PR PBB SHADOW E&M-EST. PATIENT-LVL III: ICD-10-PCS | Mod: PBBFAC,,, | Performed by: PSYCHIATRY & NEUROLOGY

## 2019-07-02 PROCEDURE — 99999 PR STA SHADOW: CPT | Mod: PBBFAC,,, | Performed by: PSYCHIATRY & NEUROLOGY

## 2019-07-02 RX ORDER — CARBIDOPA AND LEVODOPA 25; 250 MG/1; MG/1
1 TABLET ORAL 3 TIMES DAILY
Qty: 270 TABLET | Refills: 3 | Status: SHIPPED | OUTPATIENT
Start: 2019-07-02 | End: 2020-01-07 | Stop reason: SDUPTHER

## 2019-07-02 NOTE — PROGRESS NOTES
HPI:  Tari Pablo is a 67 y.o. female with several months of speech and gait changes. Exam shows scanning cerebellar speech and myelopathic/ataxic signs as well right sided UMN signs with increased spasticity. Questioning  Neurodegenerative process like PD plus syndrome (CBGD, OPCD) vs spinal cerebellar ataxia. MRI brain and C spine unremarkable. Note development of tremor more recently.       The patient was d/c'ed from Dr Newsome and saw NP Jossie in neurology here once since the last visit with me.    A fall since the last visit, 2 months ago. Likely had 3 total falls in the last 6 months, none with injury.  When she falls, she falls back.   She does home exercises  Her tremor is only rarely active.    Mood is good. Not crying much.   Memory is good.  Sleep is good.      Review of Systems   Constitutional: Negative for fever.   HENT: Negative for nosebleeds.    Eyes: Negative for double vision.   Respiratory: Negative for hemoptysis.    Cardiovascular: Negative for leg swelling.   Gastrointestinal: Negative for blood in stool and constipation.   Genitourinary: Negative for hematuria.   Musculoskeletal: Negative for falls and neck pain.   Skin: Negative for rash.   Neurological: Negative for seizures.   Psychiatric/Behavioral: Negative for memory loss.         I have reviewed all of this patient's past medical and surgical histories as well as family and social histories and active allergies and medications as documented in the electronic medical record.    Exam:  Gen Appearance, well developed/nourished in no apparent distress  CV: 2+ distal pulses with no edema or swelling  Neuro:  MS: Awake, alert,Sustains attention. Recent/remote memory intact, Language is full to spontaneous speech/comprehension. Fund of Knowledge is full but patient's speech seems mildly slow, slurred, and scanning- cerebellar speech deficit which is subtle.   She has mild emotional incontinence once today when speaking of a joyful  event  CN: Optic discs are flat with normal vasculature, PERRL, Extraoccular movements and visual fields are full. Normal facial sensation and strength, Hearing symmetric, Tongue and Palate are midline and strong. Shoulder Shrug symmetric and strong.  Motor: Normal bulk, tone is increased to spasticity in the legs more than arms and more spasticity on the right , no abnormal movement at rest but tremor of outstretched.  5/5 strength bilateral upper/lower extremities with 2+ reflexes in the arms and 3+ in the legs with right foot clonus    There is reduced blink and reduced facial expression and bradykinesia of finger tapping  Sensory: Romberg negative  Cerebellar: Finger-nose,Heal-shin, Rapid alternating movements intact  Gait: stance is mildly wide based, there is moderate  Ataxia and shuffling stable since the last visit with enbloc turning prominent. There is mild impairment of postural reflexes.     Imaging: MRI brain repeat 2017 (repeated after 2017 exam: Age-appropriate generalized volume loss with scattered T2/FLAIR signal abnormality within the supratentorial white matter  while nonspecific suggestive for moderate degree of  chronic microvascular ischemic change.    No evidence for acute infarction or enhancing lesion.    MRI C spine: Multilevel degenerative changes of the cervical spine contributing to central canal stenosis or neural foraminal narrowing as detailed in the above report.    Atrophy of the thyroid gland with bilateral thyroid nodules.  Consider further evaluation with thyroid ultrasound as clinically warranted      Carotid US: less than 40% stenosis 2017    EE: normal    EMG 2017: no denervation in the head and neck     Thyroid US 2018: Multinodular thyroid disease as noted above.    Labs: 2017: B12, HTLV antibodies,CK, RPR, Heavy metal screening, Thiamine, Zinc, Copper, VLFA, JULIO C, TSH, CMP, CBC, Paraneoplastic antibodies and anti-ALEXY antibodies    2017 UA done pre-op for wrist  surgery: Trace of blood (non-cath specimen)- repeat UA in 2018: no blood    Assessment/Plan: Tari Pablo is a 68 y.o. female  with Progressive Supranuclear Palsy in 2018. Exam shows scanning cerebellar speech and myelopathic/ataxic signs as well right sided UMN signs with increased spasticity. MRI brain and C spine unremarkable  I recommend:     1. . Saw movement disorders neurologist about symptoms thought to be progress supranuclear palsy. Continues on Sinemet TID. Will follow up with movement disorders prn per patient.   2. Fall precautions reviewed. Her falls have been a bit more frequent since the last visit. Could do home PT/OT if this continues as she is homebound currently. The patient is doing home exercises currently  -wheelchair for home use  3. Monitor pseudobulbar affect over time. She declined treatment prior and states this is well controlled      RTC 6 months

## 2019-07-09 ENCOUNTER — TELEPHONE (OUTPATIENT)
Dept: NEUROLOGY | Facility: CLINIC | Age: 69
End: 2019-07-09

## 2019-07-09 DIAGNOSIS — G23.1 PROGRESSIVE SUPRANUCLEAR PALSY: Primary | ICD-10-CM

## 2019-07-09 DIAGNOSIS — R29.6 FREQUENT FALLS: ICD-10-CM

## 2019-07-09 NOTE — TELEPHONE ENCOUNTER
----- Message from Rachel Jc sent at 2019  1:54 PM CDT -----  Contact: PATIENT  Tari Pablo  MRN: [id number]  : 1950  PCP: Isai Villalta  Home Phone      [phone]  Work Phone      Not on file.  Mobile          [phone]      MESSAGE: Patient states that she would like to go head with physical therapy as recommended by Dr. Pop.  Patient states that Dr. Pop told her that she would qualify for home based therapy due to problems with transportation.  She would like to start with therapy next week.        Phone: [phone]

## 2019-07-25 ENCOUNTER — TELEPHONE (OUTPATIENT)
Dept: HOME HEALTH SERVICES | Facility: HOSPITAL | Age: 69
End: 2019-07-25
Payer: MEDICARE

## 2019-08-14 ENCOUNTER — EXTERNAL HOME HEALTH (OUTPATIENT)
Dept: HOME HEALTH SERVICES | Facility: HOSPITAL | Age: 69
End: 2019-08-14

## 2020-01-07 ENCOUNTER — OFFICE VISIT (OUTPATIENT)
Dept: NEUROLOGY | Facility: CLINIC | Age: 70
End: 2020-01-07
Payer: MEDICARE

## 2020-01-07 VITALS
WEIGHT: 150.81 LBS | HEIGHT: 62 IN | SYSTOLIC BLOOD PRESSURE: 118 MMHG | OXYGEN SATURATION: 96 % | RESPIRATION RATE: 16 BRPM | DIASTOLIC BLOOD PRESSURE: 76 MMHG | HEART RATE: 74 BPM | BODY MASS INDEX: 27.75 KG/M2

## 2020-01-07 DIAGNOSIS — F48.2 PSEUDOBULBAR AFFECT: ICD-10-CM

## 2020-01-07 DIAGNOSIS — G23.1 PROGRESSIVE SUPRANUCLEAR PALSY: Primary | ICD-10-CM

## 2020-01-07 DIAGNOSIS — Z91.81 AT RISK FOR FALLS: ICD-10-CM

## 2020-01-07 PROCEDURE — 99213 OFFICE O/P EST LOW 20 MIN: CPT | Mod: PBBFAC | Performed by: PSYCHIATRY & NEUROLOGY

## 2020-01-07 PROCEDURE — 99213 OFFICE O/P EST LOW 20 MIN: CPT | Mod: S$PBB | Performed by: PSYCHIATRY & NEUROLOGY

## 2020-01-07 PROCEDURE — 99999 PR STA SHADOW: CPT | Mod: PBBFAC,,, | Performed by: PSYCHIATRY & NEUROLOGY

## 2020-01-07 PROCEDURE — 99999 PR PBB SHADOW E&M-EST. PATIENT-LVL III: ICD-10-PCS | Mod: PBBFAC,,, | Performed by: PSYCHIATRY & NEUROLOGY

## 2020-01-07 PROCEDURE — 99999 PR PBB SHADOW E&M-EST. PATIENT-LVL III: CPT | Mod: PBBFAC,,, | Performed by: PSYCHIATRY & NEUROLOGY

## 2020-01-07 RX ORDER — CARBIDOPA AND LEVODOPA 25; 250 MG/1; MG/1
1 TABLET ORAL 3 TIMES DAILY
Qty: 270 TABLET | Refills: 3 | Status: SHIPPED | OUTPATIENT
Start: 2020-01-07 | End: 2020-12-21

## 2020-01-07 NOTE — PROGRESS NOTES
HPI:  Tari Pablo is a 67 y.o. female with several months of speech and gait changes. Exam shows scanning cerebellar speech and myelopathic/ataxic signs as well right sided UMN signs with increased spasticity. Questioning  Neurodegenerative process like PD plus syndrome (CBGD, OPCD) vs spinal cerebellar ataxia. MRI brain and C spine unremarkable. Note development of tremor more recently.       The patient had home health since the last visit and has not fallen since  She is continuing home exercises.  No tremor  PBA are not impairing. Not enough to take  medication for  No depression    Review of Systems   Constitutional: Negative for fever.   HENT: Negative for nosebleeds.    Eyes: Negative for double vision.   Respiratory: Negative for hemoptysis.    Cardiovascular: Negative for leg swelling.   Gastrointestinal: Negative for blood in stool and constipation.   Genitourinary: Negative for hematuria.   Musculoskeletal: Negative for falls.   Skin: Negative for rash.   Neurological: Negative for dizziness, tremors and headaches.   Psychiatric/Behavioral: Negative for memory loss.         I have reviewed all of this patient's past medical and surgical histories as well as family and social histories and active allergies and medications as documented in the electronic medical record.    Exam:  Gen Appearance, well developed/nourished in no apparent distress  CV: 2+ distal pulses with no edema or swelling  Neuro:  MS: Awake, alert,Sustains attention. Recent/remote memory intact, Language is full to spontaneous speech/comprehension. Fund of Knowledge is full but patient's speech seems mildly slow, slurred, and scanning- cerebellar speech deficit which is subtle.   She has mild emotional incontinence once today when speaking of a joyful event  CN: Optic discs are flat with normal vasculature, PERRL, Extraoccular movements and visual fields are full. Normal facial sensation and strength, Hearing symmetric, Tongue and Palate  are midline and strong. Shoulder Shrug symmetric and strong.  Motor: Normal bulk, tone is increased to spasticity in the legs more than arms and more spasticity on the right , no abnormal movement at rest but tremor of outstretched.  5/5 strength bilateral upper/lower extremities with 2+ reflexes in the arms and 3+ in the legs with right foot clonus    There is reduced blink and reduced facial expression and bradykinesia of finger tapping  Sensory: Romberg negative  Cerebellar: Finger-nose,Heal-shin, Rapid alternating movements intact  Gait: stance is mildly wide based, there is moderate  Ataxia and shuffling stable since the last visit with enbloc turning prominent. There is mild impairment of postural reflexes.     Imaging: MRI brain repeat 2017 (repeated after 2017 exam: Age-appropriate generalized volume loss with scattered T2/FLAIR signal abnormality within the supratentorial white matter  while nonspecific suggestive for moderate degree of  chronic microvascular ischemic change.    No evidence for acute infarction or enhancing lesion.    MRI C spine: Multilevel degenerative changes of the cervical spine contributing to central canal stenosis or neural foraminal narrowing as detailed in the above report.    Atrophy of the thyroid gland with bilateral thyroid nodules.  Consider further evaluation with thyroid ultrasound as clinically warranted      Carotid US: less than 40% stenosis 2017    EE: normal    EMG 2017: no denervation in the head and neck     Thyroid US 2018: Multinodular thyroid disease as noted above.    Labs: 2017: B12, HTLV antibodies,CK, RPR, Heavy metal screening, Thiamine, Zinc, Copper, VLFA, JULIO C, TSH, CMP, CBC, Paraneoplastic antibodies and anti-ALEXY antibodies    2017 UA done pre-op for wrist surgery: Trace of blood (non-cath specimen)- repeat UA in 2018: no blood    Assessment/Plan: Tari Pablo is a 68 y.o. female  with Progressive Supranuclear Palsy in 2018. Exam shows  scanning cerebellar speech and myelopathic/ataxic signs as well right sided UMN signs with increased spasticity. MRI brain and C spine unremarkable  I recommend:     1. Thought to be progress supranuclear palsy by movement disorders neurology. Continues on Sinemet TID. Will follow up with movement disorders prn   2. Fall precautions reviewed. Completed home PT/OT in 2019 and has not fallen since  3. Monitor pseudobulbar affect over time. She declined treatment prior and states this is well controlled      RTC 6 months

## 2020-07-07 ENCOUNTER — OFFICE VISIT (OUTPATIENT)
Dept: NEUROLOGY | Facility: CLINIC | Age: 70
End: 2020-07-07
Payer: MEDICARE

## 2020-07-07 VITALS
SYSTOLIC BLOOD PRESSURE: 136 MMHG | DIASTOLIC BLOOD PRESSURE: 86 MMHG | BODY MASS INDEX: 29.01 KG/M2 | HEIGHT: 62 IN | OXYGEN SATURATION: 96 % | RESPIRATION RATE: 16 BRPM | HEART RATE: 94 BPM | TEMPERATURE: 98 F | WEIGHT: 157.63 LBS

## 2020-07-07 DIAGNOSIS — F48.2 PSEUDOBULBAR AFFECT: ICD-10-CM

## 2020-07-07 DIAGNOSIS — G23.1 PROGRESSIVE SUPRANUCLEAR PALSY: Primary | ICD-10-CM

## 2020-07-07 DIAGNOSIS — H26.8 OTHER CATARACT, UNSPECIFIED LATERALITY: ICD-10-CM

## 2020-07-07 DIAGNOSIS — Z91.81 AT RISK FOR FALLS: ICD-10-CM

## 2020-07-07 PROCEDURE — 99999 PR STA SHADOW: CPT | Mod: PBBFAC,,, | Performed by: PSYCHIATRY & NEUROLOGY

## 2020-07-07 PROCEDURE — 99999 PR STA SHADOW: ICD-10-PCS | Mod: PBBFAC,,, | Performed by: PSYCHIATRY & NEUROLOGY

## 2020-07-07 PROCEDURE — 99999 PR PBB SHADOW E&M-EST. PATIENT-LVL IV: CPT | Mod: PBBFAC,,, | Performed by: PSYCHIATRY & NEUROLOGY

## 2020-07-07 PROCEDURE — 99214 OFFICE O/P EST MOD 30 MIN: CPT | Mod: PBBFAC | Performed by: PSYCHIATRY & NEUROLOGY

## 2020-07-07 PROCEDURE — 99214 OFFICE O/P EST MOD 30 MIN: CPT | Mod: S$PBB | Performed by: PSYCHIATRY & NEUROLOGY

## 2020-07-07 NOTE — PROGRESS NOTES
HPI:  Tari Pablo is a 69 y.o. female with several months of speech and gait changes. Exam shows scanning cerebellar speech and myelopathic/ataxic signs as well right sided UMN signs with increased spasticity. Questioning  Neurodegenerative process like PD plus syndrome (CBGD, OPCD) vs spinal cerebellar ataxia. MRI brain and C spine unremarkable. Note development of tremor more recently.       No Falls  She is continuing home exercises.  No tremor  Symptoms are stable  PBA symptoms occur but are management.   Mood is good    Pending cataract surgery   Review of Systems   Constitutional: Negative for fever.   HENT: Negative for nosebleeds.    Eyes: Negative for double vision.   Respiratory: Negative for hemoptysis.    Cardiovascular: Negative for leg swelling.   Gastrointestinal: Negative for blood in stool.   Genitourinary: Negative for hematuria.   Musculoskeletal: Negative for falls.   Skin: Negative for rash.   Neurological: Negative for dizziness and tremors.   Psychiatric/Behavioral: Negative for memory loss.         I have reviewed all of this patient's past medical and surgical histories as well as family and social histories and active allergies and medications as documented in the electronic medical record.    Exam:  Gen Appearance, well developed/nourished in no apparent distress  CV: 2+ distal pulses with no edema or swelling  Neuro:  MS: Awake, alert,Sustains attention. Recent/remote memory intact, Language is full to spontaneous speech/comprehension. Fund of Knowledge is full but patient's speech seems mildly slow, slurred, and scanning- cerebellar speech deficit which is subtle.   She has mild emotional incontinence once today when speaking of a joyful event  CN: Optic discs are flat with normal vasculature, PERRL, Extraoccular movements and visual fields are full. Normal facial sensation and strength, Hearing symmetric, Tongue and Palate are midline and strong. Shoulder Shrug symmetric and  strong.  Motor: Normal bulk, tone is increased to spasticity in the legs more than arms and more spasticity on the right , no abnormal movement at rest but tremor of outstretched.  5/5 strength bilateral upper/lower extremities with 2+ reflexes in the arms and 3+ in the legs with right foot clonus    There is reduced blink and reduced facial expression and bradykinesia of finger tapping  Sensory: Romberg negative  Cerebellar: Finger-nose,Heal-shin, Rapid alternating movements intact  Gait: stance is mildly wide based, there is moderate  Ataxia and shuffling stable since the last visit with enbloc turning prominent. There is moderate impairment of postural reflexes.     Imaging: MRI brain repeat 2017 (repeated after 2017 exam: Age-appropriate generalized volume loss with scattered T2/FLAIR signal abnormality within the supratentorial white matter  while nonspecific suggestive for moderate degree of  chronic microvascular ischemic change.    No evidence for acute infarction or enhancing lesion.    MRI C spine: Multilevel degenerative changes of the cervical spine contributing to central canal stenosis or neural foraminal narrowing as detailed in the above report.    Atrophy of the thyroid gland with bilateral thyroid nodules.  Consider further evaluation with thyroid ultrasound as clinically warranted      Carotid US: less than 40% stenosis 2017    EE: normal    EMG 2017: no denervation in the head and neck     Thyroid US 2018: Multinodular thyroid disease as noted above.    Labs: 2017: B12, HTLV antibodies,CK, RPR, Heavy metal screening, Thiamine, Zinc, Copper, VLFA, JULIO C, TSH, CMP, CBC, Paraneoplastic antibodies and anti-ALEXY antibodies    2017 UA done pre-op for wrist surgery: Trace of blood (non-cath specimen)- repeat UA in 2018: no blood    Assessment/Plan: Tari Pablo is a 69 y.o. female  with Progressive Supranuclear Palsy in 2018. Exam shows scanning cerebellar speech and myelopathic/ataxic signs  as well right sided UMN signs with increased spasticity. MRI brain and C spine unremarkable  I recommend:     1. Thought to be progressive supranuclear palsy by movement disorders neurology. Continues on Sinemet TID. Will follow up with movement disorders prn   2. Fall precautions reviewed. Completed home PT/OT in 2019 and has not fallen since. Consider a lift chair discussed to reduce backwards falling risk with sitting and standing. She will consider.   3. Monitor pseudobulbar affect over time. She declined treatment prior and states this is well controlled  4. No restriction for upcoming cataract surgery. May have mild challenges with mild sedative    RTC 6 months

## 2021-01-11 ENCOUNTER — OFFICE VISIT (OUTPATIENT)
Dept: NEUROLOGY | Facility: CLINIC | Age: 71
End: 2021-01-11
Payer: MEDICARE

## 2021-01-11 VITALS
TEMPERATURE: 96 F | RESPIRATION RATE: 18 BRPM | SYSTOLIC BLOOD PRESSURE: 144 MMHG | WEIGHT: 154.31 LBS | HEIGHT: 62 IN | DIASTOLIC BLOOD PRESSURE: 80 MMHG | BODY MASS INDEX: 28.39 KG/M2 | HEART RATE: 102 BPM

## 2021-01-11 DIAGNOSIS — G23.1 PROGRESSIVE SUPRANUCLEAR PALSY: Primary | ICD-10-CM

## 2021-01-11 DIAGNOSIS — H61.23 BILATERAL IMPACTED CERUMEN: ICD-10-CM

## 2021-01-11 DIAGNOSIS — F48.2 PSEUDOBULBAR AFFECT: ICD-10-CM

## 2021-01-11 DIAGNOSIS — R29.6 FALLS FREQUENTLY: ICD-10-CM

## 2021-01-11 PROCEDURE — 99214 OFFICE O/P EST MOD 30 MIN: CPT | Mod: S$PBB | Performed by: PSYCHIATRY & NEUROLOGY

## 2021-01-11 PROCEDURE — 99999 PR STA SHADOW: ICD-10-PCS | Mod: PBBFAC,,, | Performed by: PSYCHIATRY & NEUROLOGY

## 2021-01-11 PROCEDURE — 99214 OFFICE O/P EST MOD 30 MIN: CPT | Mod: PBBFAC | Performed by: PSYCHIATRY & NEUROLOGY

## 2021-01-11 PROCEDURE — 99999 PR PBB SHADOW E&M-EST. PATIENT-LVL IV: CPT | Mod: PBBFAC,,, | Performed by: PSYCHIATRY & NEUROLOGY

## 2021-01-11 PROCEDURE — 99999 PR STA SHADOW: CPT | Mod: PBBFAC,,, | Performed by: PSYCHIATRY & NEUROLOGY

## 2021-01-13 PROCEDURE — G0180 PR HOME HEALTH MD CERTIFICATION: ICD-10-PCS | Mod: ,,, | Performed by: PSYCHIATRY & NEUROLOGY

## 2021-01-13 PROCEDURE — G0180 MD CERTIFICATION HHA PATIENT: HCPCS | Mod: ,,, | Performed by: PSYCHIATRY & NEUROLOGY

## 2021-01-26 ENCOUNTER — DOCUMENT SCAN (OUTPATIENT)
Dept: HOME HEALTH SERVICES | Facility: HOSPITAL | Age: 71
End: 2021-01-26
Payer: MEDICARE

## 2021-02-03 ENCOUNTER — EXTERNAL HOME HEALTH (OUTPATIENT)
Dept: HOME HEALTH SERVICES | Facility: HOSPITAL | Age: 71
End: 2021-02-03
Payer: MEDICARE

## 2021-02-08 RX ORDER — CARBIDOPA AND LEVODOPA 25; 250 MG/1; MG/1
1 TABLET ORAL 3 TIMES DAILY
Qty: 270 TABLET | Refills: 3 | Status: SHIPPED | OUTPATIENT
Start: 2021-02-08 | End: 2021-12-22

## 2021-07-12 ENCOUNTER — OFFICE VISIT (OUTPATIENT)
Dept: NEUROLOGY | Facility: CLINIC | Age: 71
End: 2021-07-12
Payer: MEDICARE

## 2021-07-12 VITALS
WEIGHT: 148.81 LBS | DIASTOLIC BLOOD PRESSURE: 78 MMHG | SYSTOLIC BLOOD PRESSURE: 126 MMHG | RESPIRATION RATE: 14 BRPM | HEART RATE: 88 BPM | HEIGHT: 62 IN | BODY MASS INDEX: 27.38 KG/M2

## 2021-07-12 DIAGNOSIS — R29.6 FALLS FREQUENTLY: ICD-10-CM

## 2021-07-12 DIAGNOSIS — G23.1 PROGRESSIVE SUPRANUCLEAR PALSY: Primary | ICD-10-CM

## 2021-07-12 PROCEDURE — 99213 OFFICE O/P EST LOW 20 MIN: CPT | Mod: PBBFAC | Performed by: PSYCHIATRY & NEUROLOGY

## 2021-07-12 PROCEDURE — 99999 PR PBB SHADOW E&M-EST. PATIENT-LVL III: CPT | Mod: PBBFAC,,, | Performed by: PSYCHIATRY & NEUROLOGY

## 2021-07-12 PROCEDURE — 99999 PR STA SHADOW: CPT | Mod: PBBFAC,,, | Performed by: PSYCHIATRY & NEUROLOGY

## 2021-07-12 PROCEDURE — 99213 OFFICE O/P EST LOW 20 MIN: CPT | Mod: S$PBB | Performed by: PSYCHIATRY & NEUROLOGY

## 2021-07-12 PROCEDURE — 99999 PR PBB SHADOW E&M-EST. PATIENT-LVL III: ICD-10-PCS | Mod: PBBFAC,,, | Performed by: PSYCHIATRY & NEUROLOGY

## 2021-07-12 RX ORDER — PREDNISOLONE ACETATE 10 MG/ML
1 SUSPENSION/ DROPS OPHTHALMIC 4 TIMES DAILY
COMMUNITY
Start: 2021-07-01 | End: 2022-07-14

## 2021-07-12 RX ORDER — KETOROLAC TROMETHAMINE 5 MG/ML
1 SOLUTION OPHTHALMIC 4 TIMES DAILY
COMMUNITY
Start: 2021-07-01 | End: 2022-07-14

## 2021-07-12 RX ORDER — CIPROFLOXACIN HYDROCHLORIDE 3 MG/ML
1 SOLUTION/ DROPS OPHTHALMIC 4 TIMES DAILY
COMMUNITY
Start: 2021-07-01 | End: 2022-07-14

## 2022-01-11 ENCOUNTER — PATIENT MESSAGE (OUTPATIENT)
Dept: NEUROLOGY | Facility: CLINIC | Age: 72
End: 2022-01-11
Payer: MEDICARE

## 2022-01-13 ENCOUNTER — TELEPHONE (OUTPATIENT)
Dept: NEUROLOGY | Facility: CLINIC | Age: 72
End: 2022-01-13
Payer: MEDICARE

## 2022-01-13 RX ORDER — CARBIDOPA AND LEVODOPA 25; 250 MG/1; MG/1
1 TABLET ORAL 3 TIMES DAILY
Qty: 270 TABLET | Refills: 3 | Status: SHIPPED | OUTPATIENT
Start: 2022-01-13 | End: 2022-07-14

## 2022-07-14 ENCOUNTER — OFFICE VISIT (OUTPATIENT)
Dept: NEUROLOGY | Facility: CLINIC | Age: 72
End: 2022-07-14
Payer: MEDICARE

## 2022-07-14 VITALS
WEIGHT: 138.88 LBS | HEART RATE: 92 BPM | SYSTOLIC BLOOD PRESSURE: 124 MMHG | BODY MASS INDEX: 25.55 KG/M2 | RESPIRATION RATE: 18 BRPM | DIASTOLIC BLOOD PRESSURE: 86 MMHG | HEIGHT: 62 IN

## 2022-07-14 DIAGNOSIS — H61.23 CERUMEN DEBRIS ON TYMPANIC MEMBRANE OF BOTH EARS: ICD-10-CM

## 2022-07-14 DIAGNOSIS — F48.2 PSEUDOBULBAR AFFECT: ICD-10-CM

## 2022-07-14 DIAGNOSIS — R29.6 FALLS FREQUENTLY: ICD-10-CM

## 2022-07-14 DIAGNOSIS — G23.1 PROGRESSIVE SUPRANUCLEAR PALSY: Primary | ICD-10-CM

## 2022-07-14 PROCEDURE — 99214 OFFICE O/P EST MOD 30 MIN: CPT | Mod: PBBFAC | Performed by: PSYCHIATRY & NEUROLOGY

## 2022-07-14 PROCEDURE — 99999 PR PBB SHADOW E&M-EST. PATIENT-LVL IV: CPT | Mod: PBBFAC,,, | Performed by: PSYCHIATRY & NEUROLOGY

## 2022-07-14 PROCEDURE — 99214 OFFICE O/P EST MOD 30 MIN: CPT | Mod: S$PBB | Performed by: PSYCHIATRY & NEUROLOGY

## 2022-07-14 PROCEDURE — 99999 PR STA SHADOW: CPT | Mod: PBBFAC,,, | Performed by: PSYCHIATRY & NEUROLOGY

## 2022-07-14 PROCEDURE — 99999 PR PBB SHADOW E&M-EST. PATIENT-LVL IV: ICD-10-PCS | Mod: PBBFAC,,, | Performed by: PSYCHIATRY & NEUROLOGY

## 2022-07-14 RX ORDER — CARBIDOPA AND LEVODOPA 25; 250 MG/1; MG/1
1 TABLET ORAL 4 TIMES DAILY
Qty: 360 TABLET | Refills: 3 | Status: SHIPPED | OUTPATIENT
Start: 2022-07-14 | End: 2023-06-01 | Stop reason: SDUPTHER

## 2022-07-14 RX ORDER — ASCORBIC ACID 500 MG
500 TABLET ORAL DAILY
COMMUNITY

## 2022-07-14 NOTE — PROGRESS NOTES
HPI:  Tari Pablo is a 70 y.o. female with several months of speech and gait changes. Exam shows scanning cerebellar speech and myelopathic/ataxic signs as well right sided UMN signs with increased spasticity. Questioning  Neurodegenerative process like PD plus syndrome (CBGD, OPCD) vs spinal cerebellar ataxia. MRI brain and C spine unremarkable. Note development of tremor more recently.       Patient is here for a yearly visit    She has difficulty maintaining 6months appointments due to transportation issues but stated she had been unchanged      Walking seems to be more slow at times    Falling- 4 times in the past year. No injury and none in a couple months    Home exercises    Tremor noted a bit more    Fatigue noted    Swallowing is find now/ on concerns    Not having wearing off symptoms.    Mood symptoms are not active. Denies anxiety or depression. Some grief from 2 siblings dying    PBA prominent today but she states this variable and well tolerable      Reports right ear stuffiness/muffled hearing    Review of Systems   Constitutional: Negative for fever.   HENT: Negative for nosebleeds.    Eyes: Negative for double vision.   Respiratory: Negative for hemoptysis.    Cardiovascular: Negative for leg swelling.   Gastrointestinal: Negative for blood in stool.   Genitourinary: Negative for hematuria.   Musculoskeletal: Positive for falls.   Skin: Negative for rash.   Neurological: Positive for tremors. Negative for dizziness and loss of consciousness.   Psychiatric/Behavioral: Negative for memory loss.         I have reviewed all of this patient's past medical and surgical histories as well as family and social histories and active allergies and medications as documented in the electronic medical record.    Exam:  Gen Appearance, well developed/nourished in no apparent distress  CV: 2+ distal pulses with no edema or swelling  Neuro:  MS: Awake, alert,Sustains attention. Recent/remote memory intact, Language  is full to spontaneous speech/comprehension. Fund of Knowledge is full but patient's speech seems mildly slow, slurred, and scanning- cerebellar speech deficit which is subtle.   She has mild emotional incontinence persistently today  -Bilateral TM shows excessive cerumen without clear impaction   CN: Optic discs are flat with normal vasculature, PERRL, Extraoccular movements and visual fields are full. Normal facial sensation and strength, Hearing symmetric, Tongue and Palate are midline and strong. Shoulder Shrug symmetric and strong.  Motor: Normal bulk, tone is increased to spasticity in the legs more than arms and more spasticity on the right , no abnormal movement at rest but tremor of outstretched.  5/5 strength bilateral upper/lower extremities with 2+ reflexes in the arms and 3+ in the legs with right foot clonus    There is reduced blink and reduced facial expression and bradykinesia of finger tapping  Sensory: Romberg negative  Cerebellar: Finger-nose,Heal-shin, Rapid alternating movements intact  Gait: stance is mildly wide based, there is moderate  Ataxia and shuffling is moderate-severe  with enbloc turning prominent. There is moderate  impairment of postural reflexes.       Imaging: MRI brain repeat 2017 (repeated after 2017 exam: Age-appropriate generalized volume loss with scattered T2/FLAIR signal abnormality within the supratentorial white matter  while nonspecific suggestive for moderate degree of  chronic microvascular ischemic change.    No evidence for acute infarction or enhancing lesion.    MRI C spine: Multilevel degenerative changes of the cervical spine contributing to central canal stenosis or neural foraminal narrowing as detailed in the above report.    Atrophy of the thyroid gland with bilateral thyroid nodules.  Consider further evaluation with thyroid ultrasound as clinically warranted      Carotid US: less than 40% stenosis 2017    EE: normal    EMG 2017: no denervation  in the head and neck     Thyroid US 2018: Multinodular thyroid disease as noted above.    Labs: 2017: B12, HTLV antibodies,CK, RPR, Heavy metal screening, Thiamine, Zinc, Copper, VLFA, JULIO C, TSH, CMP, CBC, Paraneoplastic antibodies and anti-ALEXY antibodies    12/2017 UA done pre-op for wrist surgery: Trace of blood (non-cath specimen)- repeat UA in 2018: no blood    Assessment/Plan: Tari Pablo is a 71 y.o. female  with Progressive Supranuclear Palsy in 2018. Exam shows scanning cerebellar speech and myelopathic/ataxic signs as well right sided UMN signs with increased spasticity. MRI brain and C spine unremarkable  I recommend:     1. Thought to be progressive supranuclear palsy by movement disorders neurology prior. Try increasing sinemet to QID unless effects given her worsening shuffling gait noted today. Will follow up with movement disorders clinic when available here for her next visit- patient was asked to call to arrange visit in the fall  2. Fall precautions reviewed as she has had increased falls related to postural instability as prior.  Restart home PT/OT for increased falls PRN  -consider arm attachments on walker vs upright walker if needed as reviewed prior  - Consider a lift chair discussed  prior to reduce backwards falling risk with sitting and standing discussed prior   3. Monitor pseudobulbar affect over time. She declined treatment prior.  4. Had a prior swallowing complaint that seems resolved at this time  5. Discussed using OTC cerumex for wax. See ENT or PCP if not better  RTC will be with movement disorders neurology

## 2022-07-14 NOTE — PATIENT INSTRUCTIONS
Please call [phone] or message us in [place] to arrange your appointment with Dr. Perez. Please do this in late September

## 2023-06-01 ENCOUNTER — OFFICE VISIT (OUTPATIENT)
Dept: NEUROLOGY | Facility: CLINIC | Age: 73
End: 2023-06-01
Payer: MEDICARE

## 2023-06-01 VITALS
OXYGEN SATURATION: 98 % | WEIGHT: 115.19 LBS | BODY MASS INDEX: 21.2 KG/M2 | HEIGHT: 62 IN | SYSTOLIC BLOOD PRESSURE: 120 MMHG | HEART RATE: 98 BPM | DIASTOLIC BLOOD PRESSURE: 80 MMHG | RESPIRATION RATE: 16 BRPM

## 2023-06-01 DIAGNOSIS — G20.C PARKINSONISM, UNSPECIFIED PARKINSONISM TYPE: Primary | ICD-10-CM

## 2023-06-01 DIAGNOSIS — G24.9 DYSTONIA, UNSPECIFIED: ICD-10-CM

## 2023-06-01 DIAGNOSIS — M43.6 ANTEROCOLLIS: ICD-10-CM

## 2023-06-01 DIAGNOSIS — R26.89 PRIMARY FREEZING OF GAIT: ICD-10-CM

## 2023-06-01 DIAGNOSIS — R13.19 DYSPHAGIA, NEUROLOGIC: ICD-10-CM

## 2023-06-01 PROCEDURE — 99999 PR STA SHADOW: ICD-10-PCS | Mod: PBBFAC,,, | Performed by: STUDENT IN AN ORGANIZED HEALTH CARE EDUCATION/TRAINING PROGRAM

## 2023-06-01 PROCEDURE — 99215 OFFICE O/P EST HI 40 MIN: CPT | Mod: S$PBB | Performed by: STUDENT IN AN ORGANIZED HEALTH CARE EDUCATION/TRAINING PROGRAM

## 2023-06-01 PROCEDURE — 99999 PR PBB SHADOW E&M-EST. PATIENT-LVL IV: CPT | Mod: PBBFAC,,, | Performed by: STUDENT IN AN ORGANIZED HEALTH CARE EDUCATION/TRAINING PROGRAM

## 2023-06-01 PROCEDURE — 99214 OFFICE O/P EST MOD 30 MIN: CPT | Mod: PBBFAC | Performed by: STUDENT IN AN ORGANIZED HEALTH CARE EDUCATION/TRAINING PROGRAM

## 2023-06-01 PROCEDURE — 99999 PR STA SHADOW: CPT | Mod: PBBFAC,,, | Performed by: STUDENT IN AN ORGANIZED HEALTH CARE EDUCATION/TRAINING PROGRAM

## 2023-06-01 PROCEDURE — 99417 PROLNG OP E/M EACH 15 MIN: CPT | Mod: S$PBB | Performed by: STUDENT IN AN ORGANIZED HEALTH CARE EDUCATION/TRAINING PROGRAM

## 2023-06-01 RX ORDER — CARBIDOPA AND LEVODOPA 25; 250 MG/1; MG/1
1 TABLET ORAL 4 TIMES DAILY
Qty: 360 TABLET | Refills: 3 | Status: SHIPPED | OUTPATIENT
Start: 2023-06-01

## 2023-06-01 NOTE — PATIENT INSTRUCTIONS
There are several techniques that can help people with PD overcome freezing, includin) Use music. Humming or singing a song while walking to the rhythm can help keep you moving.  2) Try a metronome. Metronomes keep a steady beat, and walking to the beat can help reduce freezing.  3) Change direction. If you cant move straight ahead, try stepping to the side first, or take a step back, before going forward.  4) Shift your weight from side to side before attempting a step can help initiate movement.  5) March in place, lifting your knees as high as you can, before stepping forward.  6) Move another part of your body. If your legs wont move, swing your arms first and then try moving your legs again.  7) Imagine a line in front of you. Visualize a line in front of you and step over it. For spots in the house that are consistently tricky, like a doorway, you can use tape on the floor to create a line to step over.  8) Use a laser pointer. Shine the laser in front of you and step on or over it.  9) Ask for help. Ask a friend or family member for a gentle nudge.  10) Practice dancing. The movements of dance are rhythmic and can help strengthen your balance and fluidity.  11) Exercise in intervals. Interval training on a stationary bike involves changing the direction or rate of activity. This can help improve strength and motor functioning.

## 2023-06-01 NOTE — PROGRESS NOTES
Name: Tari Pablo  MRN: [id number]   CSN: [id number]      Date: 06/01/2023    Chief Complaint / Interval History: Parkinsonism     Tari Pablo is a 71 y.o. female  with Progressive Supranuclear Palsy in 2018. Exam shows scanning cerebellar speech and myelopathic/ataxic signs as well right sided UMN signs with increased spasticity. MRI brain and C spine unremarkable    History of Present Illness (HPI):    Ms. Pablo is a 71 yo RH woman with parkinsonism (diagnosed PSP in 2018) who presents to [facility]. Her symptom onset was in 2017 when she began to have frequent falls and progressive difficulty with walking. Her falls would typically be backwards. She also has a lot of freezing especially with transitions. She feels stiff in her neck and her posture as progressively worsened. She has a hard time looking up primarily due to the posture of her neck. She has not neuroleptic usage. She has limited ROM of her neck. No neck injuries but does describe some neck pain. No tremor. She has family history of CMT but no other neurologic issues in the family. She is emotional but has been for her entire life and her emotions are appropriate. She also has some difficulties with swallowing. No issues with her cognition at all. Still lives independently. There is no fatiguanle weakness. No SOB. No double vision. No jerky movements. No posturing of the hands/feet.     Current Mvmt Medications:  Sinemet 25/250 1 tab QID (8am/12pm/4pm/8pm) - bedtime around 9  - Never made a difference   - Never made her sick, no dyskinesias     Prior Mvmt Medication Trials:  None     Nonmotor ROS:  Smell/Taste: no issues   Voice/Swallowing: some mild dysphagia - only when interrupting her   Gait/Falls: falling occasionally - slowly converting the house to be more user friendly   - Currently lives alone   Dizziness: none  Hydration: does well with this   Urinary Issues: no issues  Constipation: none   Sleep/RBD: none   Hallucinations/Peripheral  Illusions: none  Memory/Cognition/Language: still very independent (manages medications and finances)   Mood: ok, just emotional, not every inappropriate     Atypical ROS: None   Pseudobulbar: none  Stridor/SOB: none  Visual Change: none    Past Medical History: The patient  has a past medical history of Arthritis, Balance problem, Fall at home, High cholesterol, Hypertension, and Lumbar herniated disc.    Relevant Surgical History:   Past Surgical History:   Procedure Laterality Date    CATARACT EXTRACTION, BILATERAL  7/1/21 and 7/8/21    WRIST SURGERY      WRIST SURGERY  03/2017       Social History: The patient  reports that she has never smoked. She has never used smokeless tobacco. She reports that she does not drink alcohol and does not use drugs.     Family History: Their family history includes Cancer in her mother; Charcot-Emma-Tooth disease in her cousin; Diabetes in her mother; Hypertension in her mother; Stroke in her father. No parkinsonism in family.    Allergies: Patient has no known allergies.     Meds:   Current Outpatient Medications on File Prior to Visit   Medication Sig Dispense Refill    ascorbic acid, vitamin C, (VITAMIN C) 500 MG tablet Take 500 mg by mouth once daily.      aspirin (ECOTRIN) 81 MG EC tablet Take 81 mg by mouth once daily.      coenzyme Q10 100 mg capsule Take 100 mg by mouth once daily.      lisinopril (PRINIVIL,ZESTRIL) 40 MG tablet Take 40 mg by mouth once daily.       multivitamin (THERAGRAN) per tablet Take 1 tablet by mouth once daily.      mv-mn/iron/folic acid/herb 190 (VITAMIN D3 COMPLETE ORAL) Take 1 tablet by mouth once daily at 6am.      rosuvastatin (CRESTOR) 10 MG tablet Take 10 mg by mouth every evening.       [DISCONTINUED] carbidopa-levodopa  mg (SINEMET)  mg per tablet Take 1 tablet by mouth 4 (four) times daily. 360 tablet 3     No current facility-administered medications on file prior to visit.       Exam:  /80 (BP Location: Right arm,  "Patient Position: Sitting, BP Method: Medium (Manual))   Pulse 98   Resp 16   Ht 5' 2" (1.575 m)   Wt 52.3 kg (115 lb 3.1 oz)   SpO2 98%   BMI 21.07 kg/m²     Constitutional  Well-developed, well-nourished, appears stated age   Cardiovascular  No LE edema bilaterally   Neurological    * Mental status  MOCA = not done during today's visit     - Orientation  Oriented to conversation     - Memory   Intact recent and remote     - Attention/concentration  Attentive, vigilant during exam     - Language  Intact to conversation.     - Fund of knowledge  Aware of current events     - Executive  Well-organized thoughts     - Other     * Cranial nerves       - CN II  Pupils equal, visual fields full to confrontation     - CN III, IV, VI  Extraocular movements full, normal pursuits and saccades     - CN V  Sensation V1 - V3 intact     - CN VII  Face strong and symmetric bilaterally, right facial twitching      - CN VIII  Hearing intact bilaterally         - CN XI  SCM and trapezius 5/5 bilaterally       * Motor  Muscle bulk normal, strength 5/5 throughout, no apraxia   - possibly some next extension weakness   * Sensory   Intact to light touch throughout   * Coordination  No dysmetria with finger-to-nose    * Gait  See below.   * Deep tendon reflexes  3+ and symmetric throughout, with bilateral CA and ramsey    Babinski downgoing bilaterally   * Specialized movement exam Gen: mildly masked facies and reduced blink   Speech: hypophonic  Tremor: none  Bradykinesia: globally slow t/o  Tone: paratonia t/o  Gait: shuffled with freezing, severely stooped posture involving the cervical spine only - walks with walker     Medical Record Review:  Labs, imaging and prior notes reviewed independently.     MRI brain repeat 12/2017 (repeated after 4/2017 exam: Age-appropriate generalized volume loss with scattered T2/FLAIR signal abnormality within the supratentorial white matter  while nonspecific suggestive for moderate degree of  " chronic microvascular ischemic change.             MRI C spine: Multilevel degenerative changes of the cervical spine contributing to central canal stenosis or neural foraminal narrowing as detailed in the above report.        Atrophy of the thyroid gland with bilateral thyroid nodules.  Consider further evaluation with thyroid ultrasound as clinically warranted    Carotid US: less than 40% stenosis 2017     EE: normal     EMG 2017: no denervation in the head and neck      Thyroid US 2018: Multinodular thyroid disease as noted above.     Labs: 2017: B12, HTLV antibodies,CK, RPR, Heavy metal screening, Thiamine, Zinc, Copper, VLFA, JULIO C, TSH, CMP, CBC, Paraneoplastic antibodies and anti-ALEXY antibodies     2017 UA done pre-op for wrist surgery: Trace of blood (non-cath specimen)- repeat UA in 2018: no blood    Diagnoses:          1. Parkinsonism, unspecified Parkinsonism type  Ambulatory referral/consult to Physical/Occupational Therapy      2. Dysphagia, neurologic  Fl Modified Barium Swallow Speech    SLP video swallow      3. Primary freezing of gait  WALKER FOR HOME USE    Ambulatory referral/consult to Physical/Occupational Therapy      4. Dystonia, unspecified  Fl Modified Barium Swallow Speech      5. Anterocollis            Assessment:  Ms. Pablo is a 71 yo RH woman with parkinsonism (presumed PSP diagnosed in 2018). On exam she has full eye movements however she has notable anterocollis and freezing of gait. She also has a significant amount of rigidity and global bradykinesia. She has no cognitive concerns and still lives alone with little assistance. She has no apraxia, no abnormal posture of the hands/feet, negative applause sign. I have doubts about the diagnosis of PSP as her eye movements are intact and anterocollis would be atypical for PSP. She does not have the autonomic features to suggest MSA. She is tolerating a high dose of levodopa however it has not been overly helpful for her  symptoms. I agree with a diagnosis of parkinsonism however I am unsure of her type thus far. Some her posture may be related to an neck extensor myopathy? We agreed to the following:     Plan:  - Continue Sinemet as is.   - I have ordered swallow test for dysphagia.   - I have ordered PT for freezing of gait. I have also ordered her a USTEP walker to reduce her fall risk given here severe freezing.   - FLAVIA scan has never been done. She is very claustrophobic.   - Can consider EMG/NCS to evaluate for neck extensor myopathy which we could potentially trial steroids for?     Evonne Michel et al. Dramatic response of dropped head sign to treatment with steroid in Parkinson's disease: report of three cases. Internal medicine 50 7 (2011): 757-61 .    RTC in 3 months to see me.     Total time: 71 minutes spent on the encounter, which includes face to face time and non-face to face time preparing to see the patient (eg, review of tests), Obtaining and/or reviewing separately obtained history, Documenting clinical information in the electronic or other health record, Independently interpreting results (not separately reported) and communicating results to the patient/family/caregiver, or Care coordination (not separately reported).     Diamond Perez MD  Division of Movement and Memory Disorders  [facility]  [phone]

## 2023-06-09 PROBLEM — Z74.09 IMPAIRED FUNCTIONAL MOBILITY, BALANCE, GAIT, AND ENDURANCE: Status: ACTIVE | Noted: 2023-06-09

## 2023-09-21 ENCOUNTER — OFFICE VISIT (OUTPATIENT)
Dept: NEUROLOGY | Facility: CLINIC | Age: 73
End: 2023-09-21
Payer: MEDICARE

## 2023-09-21 VITALS
OXYGEN SATURATION: 96 % | DIASTOLIC BLOOD PRESSURE: 74 MMHG | WEIGHT: 111.31 LBS | BODY MASS INDEX: 20.48 KG/M2 | RESPIRATION RATE: 18 BRPM | HEIGHT: 62 IN | SYSTOLIC BLOOD PRESSURE: 120 MMHG | HEART RATE: 87 BPM

## 2023-09-21 DIAGNOSIS — R26.89 PRIMARY FREEZING OF GAIT: ICD-10-CM

## 2023-09-21 DIAGNOSIS — M41.53 OTHER SECONDARY SCOLIOSIS, CERVICOTHORACIC REGION: ICD-10-CM

## 2023-09-21 DIAGNOSIS — G20.C PARKINSONISM, UNSPECIFIED PARKINSONISM TYPE: Primary | ICD-10-CM

## 2023-09-21 DIAGNOSIS — M54.2 CERVICALGIA: ICD-10-CM

## 2023-09-21 DIAGNOSIS — M43.6 ANTEROCOLLIS: ICD-10-CM

## 2023-09-21 PROCEDURE — 99999 PR STA SHADOW: ICD-10-PCS | Mod: PBBFAC,,, | Performed by: STUDENT IN AN ORGANIZED HEALTH CARE EDUCATION/TRAINING PROGRAM

## 2023-09-21 PROCEDURE — 99214 OFFICE O/P EST MOD 30 MIN: CPT | Mod: S$PBB | Performed by: STUDENT IN AN ORGANIZED HEALTH CARE EDUCATION/TRAINING PROGRAM

## 2023-09-21 PROCEDURE — 99214 OFFICE O/P EST MOD 30 MIN: CPT | Mod: PBBFAC | Performed by: STUDENT IN AN ORGANIZED HEALTH CARE EDUCATION/TRAINING PROGRAM

## 2023-09-21 PROCEDURE — 99999 PR PBB SHADOW E&M-EST. PATIENT-LVL IV: CPT | Mod: PBBFAC,,, | Performed by: STUDENT IN AN ORGANIZED HEALTH CARE EDUCATION/TRAINING PROGRAM

## 2023-09-21 PROCEDURE — 99999 PR STA SHADOW: CPT | Mod: PBBFAC,,, | Performed by: STUDENT IN AN ORGANIZED HEALTH CARE EDUCATION/TRAINING PROGRAM

## 2023-09-21 RX ORDER — VITAMIN B COMPLEX
1 CAPSULE ORAL DAILY
COMMUNITY

## 2023-09-21 NOTE — PROGRESS NOTES
Name: Tari Pablo  MRN: [id number]   CSN: [id number]      Date: 09/23/2023    Chief Complaint / Interval History: Parkinsonism     Tari Pablo is a 71 y.o. female  with Progressive Supranuclear Palsy in 2018. Exam shows scanning cerebellar speech and myelopathic/ataxic signs as well right sided UMN signs with increased spasticity. MRI brain and C spine unremarkable    History of Present Illness (HPI):    Ms. Pablo is a 73 yo RH woman with parkinsonism (diagnosed PSP in 2018) who presents to [facility]. Her symptom onset was in 2017 when she began to have frequent falls and progressive difficulty with walking. Her falls would typically be backwards. She also has a lot of freezing especially with transitions. She feels stiff in her neck and her posture as progressively worsened. She has a hard time looking up primarily due to the posture of her neck. She has not neuroleptic usage. She has limited ROM of her neck. No neck injuries but does describe some neck pain. No tremor. She has family history of CMT but no other neurologic issues in the family. She is emotional but has been for her entire life and her emotions are appropriate. She also has some difficulties with swallowing. No issues with her cognition at all. Still lives independently. There is no fatiguanle weakness. No SOB. No double vision. No jerky movements. No posturing of the hands/feet.       Interval History:  Ms. Pablo pressents for follow-up with her daughter. Since her last visit she has purchased a Ustep walker which has signficantlhy improved her mobility. She has not had any falls. She is doing PT. Overall feels things are stable to improved. She had her swallow test which showed some mild oropharyngeal dysphagia, likely 2/2 related to gravitation of boluses with poor neck positioning consistent with Kyphosis. She denies any worsening of her neck drop towards the day. No fatigable weakness.    Current Mvmt Medications:  Sinemet 25/250 1 tab QID  (8am/12pm/4pm/8pm) - bedtime around 9  - Never made a difference   - Never made her sick, no dyskinesias     Prior Mvmt Medication Trials:  None     Nonmotor ROS:  Smell/Taste: no issues   Voice/Swallowing: some mild dysphagia - only when interrupting her   Gait/Falls: falling occasionally - slowly converting the house to be more user friendly   - Currently lives alone   Dizziness: none  Hydration: does well with this   Urinary Issues: no issues  Constipation: none   Sleep/RBD: none   Hallucinations/Peripheral Illusions: none  Memory/Cognition/Language: still very independent (manages medications and finances)   Mood: ok, just emotional, not ever inappropriate     Atypical ROS: None   Pseudobulbar: none  Stridor/SOB: none  Visual Change: none    Past Medical History: The patient  has a past medical history of Arthritis, Balance problem, Fall at home, High cholesterol, Hypertension, Lumbar herniated disc, and Movement disorder.    Relevant Surgical History:   Past Surgical History:   Procedure Laterality Date    CATARACT EXTRACTION, BILATERAL  7/1/21 and 7/8/21    WRIST SURGERY      WRIST SURGERY  03/2017       Social History: The patient  reports that she has never smoked. She has never used smokeless tobacco. She reports that she does not drink alcohol and does not use drugs.     Family History: Their family history includes Cancer in her mother; Charcot-Emma-Tooth disease in her cousin; Diabetes in her mother; Hypertension in her mother; Stroke in her father. No parkinsonism in family.    Allergies: Patient has no known allergies.     Meds:   Current Outpatient Medications on File Prior to Visit   Medication Sig Dispense Refill    ascorbic acid, vitamin C, (VITAMIN C) 500 MG tablet Take 500 mg by mouth once daily.      aspirin (ECOTRIN) 81 MG EC tablet Take 81 mg by mouth once daily.      b complex vitamins capsule Take 1 capsule by mouth once daily.      carbidopa-levodopa  mg (SINEMET)  mg per  "tablet Take 1 tablet by mouth 4 (four) times daily. 360 tablet 3    coenzyme Q10 100 mg capsule Take 100 mg by mouth once daily.      lisinopril (PRINIVIL,ZESTRIL) 40 MG tablet Take 40 mg by mouth once daily.       multivitamin (THERAGRAN) per tablet Take 1 tablet by mouth once daily.      mv-mn/iron/folic acid/herb 190 (VITAMIN D3 COMPLETE ORAL) Take 1 tablet by mouth once daily at 6am.      rosuvastatin (CRESTOR) 10 MG tablet Take 10 mg by mouth every evening.        No current facility-administered medications on file prior to visit.       Exam:  /74 (BP Location: Left arm, Patient Position: Sitting, BP Method: Medium (Manual))   Pulse 87   Resp 18   Ht 5' 2" (1.575 m)   Wt 50.5 kg (111 lb 5.3 oz)   SpO2 96%   BMI 20.36 kg/m²     Constitutional  Well-developed, well-nourished, appears stated age   Cardiovascular  No LE edema bilaterally   Neurological    * Mental status  MOCA = not done during today's visit     - Orientation  Oriented to conversation     - Memory   Intact recent and remote     - Attention/concentration  Attentive, vigilant during exam     - Language  Intact to conversation.     - Fund of knowledge  Aware of current events     - Executive  Well-organized thoughts     - Other     * Cranial nerves       - CN II  Pupils equal, visual fields full to confrontation     - CN III, IV, VI  Extraocular movements full, normal pursuits and saccades    Saccadic intrusions and compensatory movements for mildly weakened downgaze     - CN V  Sensation V1 - V3 intact     - CN VII  Face strong and symmetric bilaterally, right facial twitching      - CN VIII  Hearing intact bilaterally         - CN XI  SCM and trapezius 5/5 bilaterally       * Motor  Muscle bulk normal, strength 5/5 throughout, no apraxia   - possibly some next extension weakness   * Sensory   Intact to light touch throughout   * Coordination  No dysmetria with finger-to-nose    * Gait  See below.   * Deep tendon reflexes  3+ and " symmetric throughout, with bilateral CA and ramsey    Babinski downgoing bilaterally   * Specialized movement exam Gen: mildly masked facies and reduced blink   Speech: hypophonic  Tremor: none  Bradykinesia: globally slow t/o  Tone: paratonia t/o  Gait: shuffled with freezing, severely stooped posture involving the cervical spine only - walks with walker       Medical Record Review:  Labs, imaging and prior notes reviewed independently.     MRI brain repeat 2017 (repeated after 2017 exam: Age-appropriate generalized volume loss with scattered T2/FLAIR signal abnormality within the supratentorial white matter  while nonspecific suggestive for moderate degree of  chronic microvascular ischemic change.             MRI C spine: Multilevel degenerative changes of the cervical spine contributing to central canal stenosis or neural foraminal narrowing as detailed in the above report.        Atrophy of the thyroid gland with bilateral thyroid nodules.  Consider further evaluation with thyroid ultrasound as clinically warranted    Carotid US: less than 40% stenosis 2017     EE: normal     EMG 2017: no denervation in the head and neck      Thyroid US 2018: Multinodular thyroid disease as noted above.     Labs: 2017: B12, HTLV antibodies,CK, RPR, Heavy metal screening, Thiamine, Zinc, Copper, VLFA, JULIO C, TSH, CMP, CBC, Paraneoplastic antibodies and anti-ALEXY antibodies     2017 UA done pre-op for wrist surgery: Trace of blood (non-cath specimen)- repeat UA in 2018: no blood    Diagnoses:          1. Parkinsonism, unspecified Parkinsonism type  MRI Brain Without Contrast    Creatinine, serum      2. Cervicalgia  MRI Cervical Spine W WO Cont      3. Other secondary scoliosis, cervicothoracic region  MRI Cervical Spine W WO Cont      4. Primary freezing of gait        5. Anterocollis              Assessment:  Ms. Pablo is a 71 yo RH woman with parkinsonism (presumed PSP diagnosed in 2018). On exam she has full eye  movements but perhaps some saccadic intrusions and mildly weakened downgaze, she also has notable anterocollis and freezing of gait. She also has a significant amount of rigidity and global bradykinesia. She has no cognitive concerns and still lives alone with little assistance. She has no apraxia, no abnormal posture of the hands/feet, negative applause sign. I have doubts about the diagnosis of PSP as her eye movements abnormalities are not predominant and anterocollis would be atypical for PSP. She does not have the autonomic features to suggest MSA. She is tolerating a high dose of levodopa however it has not been overly helpful for her symptoms. I agree with a diagnosis of parkinsonism however I am unsure of her type thus far. Her course thus far has not been rapid. Some her posture may be related to an neck extensor myopathy? We agreed to the following:       Plan:  - Continue Sinemet as is.   - Continue to monitor dysphagia.   - Continue PT and use of USTEP  - FLAVIA scan has never been done. She is very claustrophobic.   - We have decided to repeat brain and cervical spine imaging today.   - Can consider EMG/NCS to evaluate for neck extensor myopathy which we could potentially trial steroids for? She has had this done before and it was normal. She is not interested in repeating this.     Evonne Michel et al. Dramatic response of dropped head sign to treatment with steroid in Parkinson's disease: report of three cases. Internal medicine 50 7 (2011): 757-69 .    RTC in 4 months to see me.     Total time: 38 minutes spent on the encounter, which includes face to face time and non-face to face time preparing to see the patient (eg, review of tests), Obtaining and/or reviewing separately obtained history, Documenting clinical information in the electronic or other health record, Independently interpreting results (not separately reported) and communicating results to the patient/family/caregiver, or Care  coordination (not separately reported).     Diamond Perez MD  Division of Movement and Memory Disorders  [facility]  [phone]

## 2023-09-23 PROBLEM — M54.2 CERVICALGIA: Status: ACTIVE | Noted: 2023-09-23

## 2023-09-23 PROBLEM — M41.53: Status: ACTIVE | Noted: 2023-09-23

## 2023-10-10 ENCOUNTER — TELEPHONE (OUTPATIENT)
Dept: NEUROLOGY | Facility: CLINIC | Age: 73
End: 2023-10-10
Payer: MEDICARE

## 2023-10-10 NOTE — TELEPHONE ENCOUNTER
----- Message from Payton Bustillo sent at 10/10/2023  1:50 PM CDT -----  Contact: Daughter, tamara Pablo  MRN: [id number]  : 1950  PCP: Isai Villalta  Home Phone      [phone]  Work Phone      Not on file.  Mobile          [phone]  Mobile          [phone]      MESSAGE: Patient decided to do the MRI so she needs to be prescribed an antianxiety medication.     PHONE; [phone]

## 2023-10-11 RX ORDER — LORAZEPAM 0.5 MG/1
0.5 TABLET ORAL EVERY 30 MIN PRN
Qty: 2 TABLET | Refills: 0 | Status: SHIPPED | OUTPATIENT
Start: 2023-10-11

## 2023-10-20 ENCOUNTER — LAB VISIT (OUTPATIENT)
Dept: LAB | Facility: HOSPITAL | Age: 73
End: 2023-10-20
Attending: STUDENT IN AN ORGANIZED HEALTH CARE EDUCATION/TRAINING PROGRAM
Payer: MEDICARE

## 2023-10-20 DIAGNOSIS — G20.C PARKINSONISM, UNSPECIFIED PARKINSONISM TYPE: ICD-10-CM

## 2023-10-20 LAB
CREAT SERPL-MCNC: 0.8 MG/DL (ref 0.5–1.4)
EST. GFR  (NO RACE VARIABLE): >60 ML/MIN/1.73 M^2

## 2023-10-20 PROCEDURE — 36415 COLL VENOUS BLD VENIPUNCTURE: CPT | Performed by: STUDENT IN AN ORGANIZED HEALTH CARE EDUCATION/TRAINING PROGRAM

## 2023-10-20 PROCEDURE — 82565 ASSAY OF CREATININE: CPT | Performed by: STUDENT IN AN ORGANIZED HEALTH CARE EDUCATION/TRAINING PROGRAM

## 2023-11-03 ENCOUNTER — PATIENT MESSAGE (OUTPATIENT)
Dept: NEUROLOGY | Facility: CLINIC | Age: 73
End: 2023-11-03
Payer: MEDICARE

## 2023-12-01 ENCOUNTER — PATIENT MESSAGE (OUTPATIENT)
Dept: NEUROLOGY | Facility: CLINIC | Age: 73
End: 2023-12-01
Payer: MEDICARE

## 2023-12-05 ENCOUNTER — PATIENT MESSAGE (OUTPATIENT)
Dept: NEUROLOGY | Facility: CLINIC | Age: 73
End: 2023-12-05
Payer: MEDICARE

## 2024-03-01 ENCOUNTER — DOCUMENT SCAN (OUTPATIENT)
Dept: HOME HEALTH SERVICES | Facility: HOSPITAL | Age: 74
End: 2024-03-01
Payer: MEDICARE

## 2025-01-09 NOTE — TELEPHONE ENCOUNTER
----- Message from Heather Gurrola MA sent at 2022  1:04 PM CST -----  Contact: jesús Pablo  MRN: [id number]  : 1950  PCP: Isai Villalta  Home Phone      [phone]  Work Phone      Not on file.  Mobile          [phone]      MESSAGE:  Needs Rx for carbidopa-levodopa  mg transferred to [facility].  Stated Rx was sent to [facility] and she no longer uses [facility].      Pharmacy:  [facility] on [address]        
Canceled prescription at [facility].  Called in prescription verbally to [facility] in [place] on [address].  Patient notified, verbalized understanding.  
No